# Patient Record
Sex: MALE | Race: WHITE
[De-identification: names, ages, dates, MRNs, and addresses within clinical notes are randomized per-mention and may not be internally consistent; named-entity substitution may affect disease eponyms.]

---

## 2017-05-21 ENCOUNTER — HOSPITAL ENCOUNTER (INPATIENT)
Dept: HOSPITAL 10 - FTE | Age: 52
LOS: 5 days | Discharge: HOME | DRG: 280 | End: 2017-05-26
Attending: INTERNAL MEDICINE | Admitting: INTERNAL MEDICINE
Payer: COMMERCIAL

## 2017-05-21 VITALS — RESPIRATION RATE: 17 BRPM | SYSTOLIC BLOOD PRESSURE: 122 MMHG | DIASTOLIC BLOOD PRESSURE: 82 MMHG

## 2017-05-21 VITALS
BODY MASS INDEX: 34.34 KG/M2 | HEIGHT: 70 IN | BODY MASS INDEX: 34.34 KG/M2 | WEIGHT: 239.86 LBS | WEIGHT: 239.86 LBS | HEIGHT: 70 IN

## 2017-05-21 VITALS — TEMPERATURE: 98 F

## 2017-05-21 VITALS — HEART RATE: 75 BPM

## 2017-05-21 DIAGNOSIS — R60.0: ICD-10-CM

## 2017-05-21 DIAGNOSIS — I26.99: ICD-10-CM

## 2017-05-21 DIAGNOSIS — R20.0: ICD-10-CM

## 2017-05-21 DIAGNOSIS — Z98.890: ICD-10-CM

## 2017-05-21 DIAGNOSIS — I82.432: ICD-10-CM

## 2017-05-21 DIAGNOSIS — Z88.6: ICD-10-CM

## 2017-05-21 DIAGNOSIS — S83.207A: ICD-10-CM

## 2017-05-21 DIAGNOSIS — F41.9: ICD-10-CM

## 2017-05-21 DIAGNOSIS — D64.9: ICD-10-CM

## 2017-05-21 DIAGNOSIS — R79.89: ICD-10-CM

## 2017-05-21 DIAGNOSIS — Z79.01: ICD-10-CM

## 2017-05-21 DIAGNOSIS — I21.4: ICD-10-CM

## 2017-05-21 DIAGNOSIS — G89.29: ICD-10-CM

## 2017-05-21 DIAGNOSIS — F17.200: ICD-10-CM

## 2017-05-21 DIAGNOSIS — I82.412: Primary | ICD-10-CM

## 2017-05-21 DIAGNOSIS — R06.00: ICD-10-CM

## 2017-05-21 DIAGNOSIS — M54.9: ICD-10-CM

## 2017-05-21 LAB
ADD SCAN DIFF: NO
ALBUMIN SERPL-MCNC: 4 G/DL (ref 3.3–4.9)
ALBUMIN/GLOB SERPL: 1.25 {RATIO}
ALP SERPL-CCNC: 75 IU/L (ref 42–121)
ALT SERPL-CCNC: 34 IU/L (ref 13–69)
ANION GAP SERPL CALC-SCNC: 18 MMOL/L (ref 8–16)
APTT BLD: 28.6 SEC (ref 25–35)
APTT BLD: 98.2 SEC (ref 25–35)
AST SERPL-CCNC: 21 IU/L (ref 15–46)
BASOPHILS # BLD AUTO: 0.1 10^3/UL (ref 0–0.1)
BASOPHILS NFR BLD: 0.6 % (ref 0–2)
BILIRUB DIRECT SERPL-MCNC: 0 MG/DL (ref 0–0.2)
BILIRUB SERPL-MCNC: 0.4 MG/DL (ref 0.2–1.3)
BNP SERPL-MCNC: 2200 PG/ML (ref 0–125)
BUN SERPL-MCNC: 14 MG/DL (ref 7–20)
CALCIUM SERPL-MCNC: 9.2 MG/DL (ref 8.4–10.2)
CHLORIDE SERPL-SCNC: 105 MMOL/L (ref 97–110)
CK MB SERPL-MCNC: 1.14 NG/ML (ref 0–2.4)
CK SERPL-CCNC: 62 IU/L (ref 23–200)
CO2 SERPL-SCNC: 25 MMOL/L (ref 21–31)
CREAT SERPL-MCNC: 1.14 MG/DL (ref 0.61–1.24)
EOSINOPHIL # BLD: 0.1 10^3/UL (ref 0–0.5)
EOSINOPHIL NFR BLD: 1.1 % (ref 0–7)
ERYTHROCYTE [DISTWIDTH] IN BLOOD BY AUTOMATED COUNT: 13 % (ref 11.5–14.5)
GLOBULIN SER-MCNC: 3.2 G/DL (ref 1.3–3.2)
GLUCOSE SERPL-MCNC: 117 MG/DL (ref 70–220)
HCT VFR BLD CALC: 47.3 % (ref 42–52)
HGB BLD-MCNC: 15.4 G/DL (ref 14–18)
INR PPP: 1.07
INR PPP: 1.11
LYMPHOCYTES # BLD AUTO: 2.1 10^3/UL (ref 0.8–2.9)
LYMPHOCYTES NFR BLD AUTO: 17.3 % (ref 15–51)
MCH RBC QN AUTO: 29.1 PG (ref 29–33)
MCHC RBC AUTO-ENTMCNC: 32.6 G/DL (ref 32–37)
MCV RBC AUTO: 89.4 FL (ref 82–101)
MONOCYTES # BLD: 1.2 10^3/UL (ref 0.3–0.9)
MONOCYTES NFR BLD: 10 % (ref 0–11)
NEUTROPHILS # BLD: 8.6 10^3/UL (ref 1.6–7.5)
NEUTROPHILS NFR BLD AUTO: 70.4 % (ref 39–77)
NRBC # BLD MANUAL: 0 10^3/UL (ref 0–0)
NRBC BLD QL: 0 /100WBC (ref 0–0)
PLATELET # BLD: 179 10^3/UL (ref 140–415)
PMV BLD AUTO: 10.2 FL (ref 7.4–10.4)
POTASSIUM SERPL-SCNC: 4.5 MMOL/L (ref 3.5–5.1)
PROT SERPL-MCNC: 7.2 G/DL (ref 6.1–8.1)
PROTHROMBIN TIME: 13.9 SEC (ref 12.2–14.2)
PROTHROMBIN TIME: 14.3 SEC (ref 12.2–14.2)
PT RATIO: 1.1
PT RATIO: 1.1
RBC # BLD AUTO: 5.29 10^6/UL (ref 4.7–6.1)
SODIUM SERPL-SCNC: 143 MMOL/L (ref 135–144)
TROPONIN I SERPL-MCNC: 0.12 NG/ML (ref 0–0.12)
WBC # BLD AUTO: 12.2 10^3/UL (ref 4.8–10.8)

## 2017-05-21 PROCEDURE — 93306 TTE W/DOPPLER COMPLETE: CPT

## 2017-05-21 PROCEDURE — 85302 CLOT INHIBIT PROT C ANTIGEN: CPT

## 2017-05-21 PROCEDURE — 84100 ASSAY OF PHOSPHORUS: CPT

## 2017-05-21 PROCEDURE — 96376 TX/PRO/DX INJ SAME DRUG ADON: CPT

## 2017-05-21 PROCEDURE — 84443 ASSAY THYROID STIM HORMONE: CPT

## 2017-05-21 PROCEDURE — 80061 LIPID PANEL: CPT

## 2017-05-21 PROCEDURE — 97530 THERAPEUTIC ACTIVITIES: CPT

## 2017-05-21 PROCEDURE — 85613 RUSSELL VIPER VENOM DILUTED: CPT

## 2017-05-21 PROCEDURE — 86146 BETA-2 GLYCOPROTEIN ANTIBODY: CPT

## 2017-05-21 PROCEDURE — 96374 THER/PROPH/DIAG INJ IV PUSH: CPT

## 2017-05-21 PROCEDURE — 83880 ASSAY OF NATRIURETIC PEPTIDE: CPT

## 2017-05-21 PROCEDURE — 97162 PT EVAL MOD COMPLEX 30 MIN: CPT

## 2017-05-21 PROCEDURE — 81003 URINALYSIS AUTO W/O SCOPE: CPT

## 2017-05-21 PROCEDURE — 80053 COMPREHEN METABOLIC PANEL: CPT

## 2017-05-21 PROCEDURE — 93005 ELECTROCARDIOGRAM TRACING: CPT

## 2017-05-21 PROCEDURE — 97116 GAIT TRAINING THERAPY: CPT

## 2017-05-21 PROCEDURE — 85300 ANTITHROMBIN III ACTIVITY: CPT

## 2017-05-21 PROCEDURE — 83735 ASSAY OF MAGNESIUM: CPT

## 2017-05-21 PROCEDURE — 71275 CT ANGIOGRAPHY CHEST: CPT

## 2017-05-21 PROCEDURE — 83036 HEMOGLOBIN GLYCOSYLATED A1C: CPT

## 2017-05-21 PROCEDURE — 86147 CARDIOLIPIN ANTIBODY EA IG: CPT

## 2017-05-21 PROCEDURE — 71010: CPT

## 2017-05-21 PROCEDURE — 93971 EXTREMITY STUDY: CPT

## 2017-05-21 PROCEDURE — 85305 CLOT INHIBIT PROT S TOTAL: CPT

## 2017-05-21 PROCEDURE — 96372 THER/PROPH/DIAG INJ SC/IM: CPT

## 2017-05-21 PROCEDURE — 82550 ASSAY OF CK (CPK): CPT

## 2017-05-21 PROCEDURE — 82553 CREATINE MB FRACTION: CPT

## 2017-05-21 PROCEDURE — 96375 TX/PRO/DX INJ NEW DRUG ADDON: CPT

## 2017-05-21 PROCEDURE — 81240 F2 GENE: CPT

## 2017-05-21 PROCEDURE — 80069 RENAL FUNCTION PANEL: CPT

## 2017-05-21 PROCEDURE — 85610 PROTHROMBIN TIME: CPT

## 2017-05-21 PROCEDURE — 83890: CPT

## 2017-05-21 PROCEDURE — 85730 THROMBOPLASTIN TIME PARTIAL: CPT

## 2017-05-21 PROCEDURE — 85025 COMPLETE CBC W/AUTO DIFF WBC: CPT

## 2017-05-21 PROCEDURE — 84484 ASSAY OF TROPONIN QUANT: CPT

## 2017-05-21 RX ADMIN — DIPHENHYDRAMINE HYDROCHLORIDE SCH MG: 50 INJECTION, SOLUTION INTRAMUSCULAR; INTRAVENOUS at 21:08

## 2017-05-21 RX ADMIN — ATORVASTATIN CALCIUM SCH MG: 20 TABLET, FILM COATED ORAL at 21:08

## 2017-05-21 RX ADMIN — NITROGLYCERIN PRN TAB: 0.4 TABLET SUBLINGUAL at 22:09

## 2017-05-21 RX ADMIN — MORPHINE SULFATE PRN MG: 2 INJECTION, SOLUTION INTRAMUSCULAR; INTRAVENOUS at 22:15

## 2017-05-21 RX ADMIN — BUSPIRONE HYDROCHLORIDE SCH MG: 10 TABLET ORAL at 22:41

## 2017-05-21 RX ADMIN — PREGABALIN SCH MG: 75 CAPSULE ORAL at 22:02

## 2017-05-21 RX ADMIN — NITROGLYCERIN PRN TAB: 0.4 TABLET SUBLINGUAL at 22:02

## 2017-05-21 RX ADMIN — HEPARIN SODIUM AND DEXTROSE SCH MLS/HR: 10000; 5 INJECTION INTRAVENOUS at 23:07

## 2017-05-21 NOTE — RADRPT
PROCEDURE:   CTA Chest. 

 

CLINICAL INDICATION:   SOB 

 

TECHNIQUE:   The study was performed utilizing a multidetector CT scanner. Direct spiral 1 mm axial 
sections were obtained from the thoracic inlet to the upper abdomen with the use of 100 cc of Omnipa
que 350 nonionic intravenous contrast material and reformatted at 3 mm. Coronal reformations were ob
tained. The images were reviewed on a PACS workstation. 

 

CT D I 120 mCi.

 

Dose 1045 mCi per centimeter

 

COMPARISON:   No prior studies are available for comparison. 

 

FINDINGS:

 

 

There is near occlusive thrombus extending from the distal right main pulmonary artery into the firs
t and second order branches of the right upper lobe, right middle lobe and right lower lobe.  There 
is minimal thrombus in the distal left main pulmonary artery with partially occlusive pulmonary embo
li and the segmental and subsegmental branches of the upper lobe and lower lobe.  No central saddle 
embolus is seen.  Main pulmonary artery measures 3.8 cm in diameter compatible with mild pulmonary h
ypertension.  Thoracic aorta is normal with no dissection or aneurysm.  No pleural or pericardial ef
fusion is present.  The lungs are clear of any infiltrate or mass.  No pneumothorax is seen.  There 
are visible but nonpathologically enlarged middle mediastinal nodes.

 

Liver, spleen and pancreas appear normal.  No adrenal masses present.  There are bilateral renal cys
ts.

 

Thoracic spine is normal.

 

IMPRESSION:

Extensive bilateral pulmonary emboli with near occlusive thrombus distal right main pulmonary artery
 extending into first and second order lobe are and segmental branches. Partially occlusive thrombus
 lobe are, segmental and subsegmental branches left pulmonary artery.

 

No aortic aneurysm or dissection.

 

Bilateral renal cysts.

_____________________________________________ 

.Mina Spencer MD, MD           Date    Time 

Electronically viewed and signed by .Mina Spencer MD, MD on 05/21/2017 14:25 

 

D:  05/21/2017 14:25  T:  05/21/2017 14:25

.A/

## 2017-05-21 NOTE — ERD
ER Documentation


Chief Complaint


Date/Time


DATE: 17 


TIME: 10:50


Chief Complaint


left leg swelling/pain x 1 mos


 


 (JORGE CORDOVA)





HPI


52-year-old male who presented emergency department for left lower extremity 

pain and swelling for about a month but has increased in the past few days.  

Was sent here by "Dr. Holman" for a lower extremity ultrasound to be ruled 

out for DVT.  Also stated that he has a left knee injury last , had 

an MRI of the left knee and was diagnosed with left knee meniscal tear by his 

doctor.





Denies headache, loss of consciousness, dizziness, blurry vision, changes in 

vision, photophobia, facial pain, ear pain, throat pain, difficulty swallowing, 

neck pain, shoulder pain, chest pain, cough, hemoptysis, abdominal pain, back 

pain, loss of appetite, nausea, vomiting, hematochezia, diarrhea, constipation, 

urinary symptoms, bladder and bowel incontinences, extremity weakness, numbness 

or tingling sensation, difficulty walking, recent travel, recent exposure to 

illness, recent antibiotic use in the last 3 months, fever, chills. 





Allergies to Vicodin.


Past medical history of chronic back pain.


Surgery: Back surgery in 2016.  Left knee surgery in  and .


Medication: Takes amoxicillin for his tooth abscess.


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


Smokes 5 sticks of cigarettes a day.


Denies use of alcohol, use of illegal drugs.


 (JORGE CORDOVA)





ROS


All systems reviewed and are negative except as per history of present illness.


 (JORGE CORDOVA)





Medications


Home Meds


Reported Medications


Bupropion Hcl* (Wellbutrin XL*) 300 Mg Tab.sr.24h, 300 MG PO DAILY, TAB.SA


   17


Buspirone Hcl* (Buspirone Hcl*) 10 Mg Tab, 10 MG PO BID, TAB


   17


Methocarbamol* (Methocarbamol*) 500 Mg Tablet, 500 MG ORAL BID


   17


Pregabalin* (Lyrica*) 75 Mg Capsule, 75 MG PO TID, CAP


   17


Amoxicillin* (Amoxicillin*) 500 Mg Cap, 500 MG PO Q8, #30 CAP


   17


Hydrocodone Bit-Acetaminophen* (Vicodin* ES) 7.5-300 Mg Tablet, 1 TAB PO Q4H Y 

for PAIN, TAB


   17


Hydrocodone Bit/Acetaminophen (Vicodin HP ) 1 Each Tablet, 1 TAB PO Q4H Y 

for PAIN, TAB


   17





Allergies


Allergies:  


Coded Allergies:  


     hydrocodone (Unverified  Allergy, Severe, 16)


 patient states he is allergic w/ generic Vicodin and makes


 him have Severe Migraine.





PMhx/Soc


History of Surgery:  Yes (R shoulder sx  & , L knee arthroscopy)


Anesthesia Reaction:  No


Hx Neurological Disorder:  Yes


Hx Respiratory Disorders:  Yes


Hx Cardiac Disorders:  No


Hx Psychiatric Problems:  No


Hx Miscellaneous Medical Probl:  Yes (anxiety, depression)


Hx Alcohol Use:  Yes


Hx Tobacco Use:  Yes


 (TASHA,JORGE F)





FmHx


Family History:  No coronary disease (MARILY ERICKSON A. )





Physical Exam


Vitals





Vital Signs








  Date Time  Temp Pulse Resp B/P Pulse Ox O2 Delivery O2 Flow Rate FiO2


 


17 12:34 98.3 86 16 102/86 99 Room Air  


 


17 12:33       2 


 


17 10:09 98.2 99 18 108/71 99   





 (GEORGINA,KIRASTOLOS A. DO)


Physical Exam


CONSTITUTIONAL: Well-appearing; well-nourished; in no apparent distress.  


HEAD: Normocephalic; atraumatic.  


EYES: Conjunctiva clear, sclera non-icteric, EOM intact. PERRL 


Ears: Hearing intact. EACs clear, TMs non-bulging, non-inflamed, translucent & 

mobile, ossicles normal appearance, No obstructions, no erythema, no discharges


Nose: No obstructions. No polyps. No external lesions. Mucosa non-inflamed. No 

external lesions, septum and turbinates normal. No rhinorrhea. No discharges. 

Frontal sinus is non-tender to palpation. Maxillary sinus is non-tender to 

palpation. 


MOUTH: Moist mucous membranes, no lesion, no obstructions, no vesicles, no 

thrush, patent airway


Throat: Uvula in midline. Right tonsil is +1 with no erythema, no exudate. Left 

tonsil is +1 with no erythema, no exudate. Tolerating secretions well. Good gag 

reflex. Patent airway.


Neck: Supple, without lesions, bruits, or adenopathy. No mass. Thyroid non-

enlarged and non-tender to palpation.


CHEST: Symmetrical chest. Respirations even and not labored. No retractions 

noted.


CARDIOVASCULAR: Normal S1, S2. RRR. No murmurs, gallops.


RESPIRATORY: Normal chest excursion with respiration; breath sounds clear and 

equal bilaterally; no wheezes, rhonchi, or rales.  Breathing even and 

unlabored. Speaking in clear, full, and complete sentences w/ ease. 


ABDOMEN: Normal bowel sounds normal. Soft, round, non-distended, non-guarding, 

no tenderness, no rebound, no organomegaly, no masses, no pulsating abdominal 

mass. No hernia. No peritoneal signs.


: No CVA tenderness. 


BACK: Symmetrical shoulder. Spine is midline without deformity, tenderness. No 

evidence of trauma or deformity.


PELVIS: Stable pelvis. No evidence of trauma or deformity.  


MUSCULOSKELETAL: Normal gait and station. No misalignment, asymmetry, 

crepitation, defects, tenderness, masses, effusions, decreased range of motion, 

instability, atrophy or abnormal strength or tone in the head, neck, spine, ribs

, pelvis or extremities except left calf has swelling and tenderness on 

palpation.  Left lower extremity is positive to Homans sign.  Left knee has no 

obvious deformity/swelling/discoloration.  Left hip/foot/ankle is unremarkable.

  Right hip/knee/ankle/foot is unremarkable.  Pedal pulses are good and within 

normal limits bilaterally.  Circulation and sensation is intact.  No 

neurovascular deficits.


NEUROVASCULAR: Distal pulses are present. Pedal pulse are present, equal, and 

normal. Capillary refills are < 2 seconds.


NEUROLOGIC: Alert and oriented x4. Speaks full and clear sentences. Cranial 

Nerves II-XII normal. Sensation to pain, touch, and proprioception normal. 

Grossly unremarkable. No neurologic deficits. Romberg test is negative.


PSYCHOLOGICAL: The patients mood and manner are appropriate. No hallucinations

, delusions. Not SI. Not HI. Has the capacity to decide for self


SKIN: Normal for age and ethnicity; warm; dry; good turgor; no apparent lesions 

or exudates. No rashes, hives, discoloration. Intact.  


 (JORGE CORDOVA)


Result Diagram:  


17 1110                                                                   

             17 1110





Results 24 hrs





 Laboratory Tests








Test


  17


11:10


 


White Blood Count 12.210^3/ul 


 


Red Blood Count 5.2910^6/ul 


 


Hemoglobin 15.4g/dl 


 


Hematocrit 47.3% 


 


Mean Corpuscular Volume 89.4fl 


 


Mean Corpuscular Hemoglobin 29.1pg 


 


Mean Corpuscular Hemoglobin


Concent 32.6g/dl 


 


 


Red Cell Distribution Width 13.0% 


 


Platelet Count 61012^3/UL 


 


Mean Platelet Volume 10.2fl 


 


Neutrophils % 70.4% 


 


Lymphocytes % 17.3% 


 


Monocytes % 10.0% 


 


Eosinophils % 1.1% 


 


Basophils % 0.6% 


 


Nucleated Red Blood Cells % 0.0/100WBC 


 


Neutrophils # 8.610^3/ul 


 


Lymphocytes # 2.110^3/ul 


 


Monocytes # 1.210^3/ul 


 


Eosinophils # 0.110^3/ul 


 


Basophils # 0.110^3/ul 


 


Nucleated Red Blood Cells # 0.010^3/ul 


 


Prothrombin Time 13.9Sec 


 


Prothrombin Time Ratio 1.1 


 


INR International Normalized


Ratio 1.07 


 


 


Activated Partial


Thromboplast Time 28.6Sec 


 


 


Sodium Level 143mmol/L 


 


Potassium Level 4.5mmol/L 


 


Chloride Level 105mmol/L 


 


Carbon Dioxide Level 25mmol/L 


 


Anion Gap 18 


 


Blood Urea Nitrogen 14mg/dl 


 


Creatinine 1.14mg/dl 


 


Glucose Level 117mg/dl 


 


Calcium Level 9.2mg/dl 


 


Total Bilirubin 0.4mg/dl 


 


Direct Bilirubin 0.00mg/dl 


 


Indirect Bilirubin 0.4mg/dl 


 


Aspartate Amino Transf


(AST/SGOT) 21IU/L 


 


 


Alanine Aminotransferase


(ALT/SGPT) 34IU/L 


 


 


Alkaline Phosphatase 75IU/L 


 


Troponin I 0.344ng/ml 


 


B-Type Natriuretic Peptide 2200PG/ML 


 


Total Protein 7.2g/dl 


 


Albumin 4.0g/dl 


 


Globulin 3.20g/dl 


 


Albumin/Globulin Ratio 1.25 








 Current Medications








 Medications


  (Trade)  Dose


 Ordered  Sig/Cheryl


 Route


 PRN Reason  Start Time


 Stop Time Status Last Admin


Dose Admin


 


 Enoxaparin Sodium


  (Lovenox)  110 mg  ONCE  STAT


 SC


   17 11:56


 17 12:34 DC 17 12:49


 


 


 Aspirin


  (Aspirin)  325 mg  ONCE  STAT


 PO


   17 12:49


 17 12:50 DC 17 13:27


 


 


 Hydromorphone HCl


  (Dilaudid)  1 mg  ONCE  STAT


 IV


   17 12:49


 17 12:50 DC 17 13:27


 


 


 Ondansetron HCl


  (Zofran Inj)  4 mg  ONCE  STAT


 IV


   17 12:49


 17 12:50 DC 17 13:27


 


 


 IV Flush 10 ml  10 ml  STK-MED ONCE


 .ROUTE


   17 12:57


 17 12:58 DC  


 


 


 Sodium Chloride  100 ml @ ud  STK-MED ONCE


 .ROUTE


   17 12:57


 17 12:58 DC  


 


 


 Iohexol


  (Omnipaque)  100 ml @ ud  STK-MED ONCE


 .ROUTE


   17 12:57


 17 12:58 DC  


 


 


 Iohexol


  (Omnipaque 350mg/


 ml)  50 ml  STK-MED ONCE


 .ROUTE


   17 12:57


 17 12:58 DC  


 





 (MARILY ERICKSON DO)





Procedures/MDM


Examination: Please see physical examination.





Disease process, medical treatment was explained to the patient and family 

member. They verbalized understanding and agreed with the diagnostic tests, 

medical treatment, and follow-up care.





Radiology


Venous Doppler ultrasound of the left lower extremity


Impression: Occlusive acute deep vein thrombosis of the left lower extremity 

involving the mid and distal portions of the femoral vein as well as the 

popliteal vein.





Result was discussed with supervising emergency room physician Dr. RACHAEL Erickson. 





Treatment:





Re-evaluation:





Consultation:





Differential diagnosis: DVT versus cellulitis versus lower extremity edema 

versus leg pain





Medical decision makin-year-old male who presented emergency department 

for left lower extremity pain and swelling for about a month but has increased 

in the past few days.  Was sent here by "Dr. Holman" for a lower extremity 

ultrasound to be ruled out for DVT.  Also stated that he has a left knee injury 

last , had an MRI of the left knee and was diagnosed with left knee 

meniscal tear by his doctor.  Patient's complaint, patient's history about his 

complaint, my physical findings, diagnostic test results, my reevaluation are 

consistent with my final diagnosis of deep vein thrombosis of the left lower 

extremity.  While I was explaining to the patient the result of the ultrasound, 

he stated it is having chest pain while he was lying flat in the rney case 

was discussed with supervising emergency room physician Dr. A. L[facility] 

medical decision making. He also agreed to take over/continue to care and 

continue the admission process.








 (JORGE CORDOVA)


This patient's care was transferred to me by the physician assistant.








This is a 52-year-old male who states he has had left leg pain in the calf 

region for 1 month is gotten worse over the past few days.  He was seen in by 

his primary care physician to have an evaluation for DVT.





He states this morning is also having some shortness of breath when he lays 

flat and is having some substernal chest pain off and on this morning.  This is 

also having a difficult time breathing at rest.





No syncope no palpitations headache dizziness








Const:      Well-developed, well-nourished


 Head:        Atraumatic, normocephalic


 Eyes:       Normal Conjunctiva, PERRLA, EOMI, normal sclera, no nystagmus


 ENT:         Normal External Ears, Nose and Mouth, moist mucus membranes.


 Neck:        Full range of motion.  No meningismus, no lymphadenopathy.


 Resp:         Clear to auscultation bilaterally, no wheezing, rhonchi, rales


 Cardio:       Regular rate and rhythm, no murmurs, S1 S2 present


 Abd:         Soft, non tender x 4, non distended. Normal bowel sounds, no 

guarding or rebound, no pulsitile abdominal masses or bruits


 Skin:         No petechiae or rashes, no ecchymosis , no maculopapular rash


 Back:        No midline or flank tenderness


 Ext:          No cyanosis, or edema, FROM x 4, normal inspection, 

neurovascularly intact x 4, the left calf is swollen and tender to palpation 

there is some mild tenderness behind the left knee, no erythema


 Neur:        Awake and alert, STR 5/5 x 4, sensation intact x 4, no focal 

findings, cerebellum intact


 Psych:        Normal Mood and Affect











EKG: 


Rate/Rhythm:             Normal sinus rhythm, left axis deviation


QRS, ST, QT:    NORMAL VA, QRS, QT]


Impression:      NORMAL EKG














Patient was given Lovenox 110 mg subcu.  Will obtain CT angios chest for 

pulmonary embolism evaluation





.

















PROCEDURE:   US DVT. 


 


CLINICAL INDICATION:   Pain.


 


TECHNIQUE:   Multiple longitudinal and transverse images of the left lower 

extremity veins were obtained with gray scale and color Doppler imaging.  2D 

grayscale measurements with compression, color Doppler flow, and augmentation 

was performed.   


 


COMPARISON:   None. 


 


FINDINGS:


 


The left common femoral and proximal femoral veins are patent and fully 

compressible with normal wave forms and response to augmentation.  The mid and 

distal portions of the femoral vein as well as the popliteal vein are 

noncompressible with heterogeneous intraluminal clot.  There is no detectable 

Doppler flow.


 


IMPRESSION:


 


Occlusive acute deep venous thrombosis of the left lower extremity involving 

the mid and distal portions of the femoral vein as well as the popliteal vein.


 


RPTAT: QQ


_____________________________________________ 


.Avril Bonner MD, MD           Date    Time 


Electronically viewed and signed by .Avril Bonner MD, MD on 2017 11:43 


 


D:  2017 11:43  T:  2017 11:43


.T/





CC: JORGE CORDVOA














Patient's troponin is elevated at 0.344.














PROCEDURE:   CTA Chest. 


 


CLINICAL INDICATION:   SOB 


 


TECHNIQUE:   The study was performed utilizing a multidetector CT scanner. 

Direct spiral 1 mm axial sections were obtained from the thoracic inlet to the 

upper abdomen with the use of 100 cc of Omnipaque 350 nonionic intravenous 

contrast material and reformatted at 3 mm. Coronal reformations were obtained. 

The images were reviewed on a PACS workstation. 


 


CT D I 120 mCi.


 


Dose 1045 mCi per centimeter


 


COMPARISON:   No prior studies are available for comparison. 


 


FINDINGS:


 


 


There is near occlusive thrombus extending from the distal right main pulmonary 

artery into the first and second order branches of the right upper lobe, right 

middle lobe and right lower lobe.  There is minimal thrombus in the distal left 

main pulmonary artery with partially occlusive pulmonary emboli and the 

segmental and subsegmental branches of the upper lobe and lower lobe.  No 

central saddle embolus is seen.  Main pulmonary artery measures 3.8 cm in 

diameter compatible with mild pulmonary hypertension.  Thoracic aorta is normal 

with no dissection or aneurysm.  No pleural or pericardial effusion is present.

  The lungs are clear of any infiltrate or mass.  No pneumothorax is seen.  

There are visible but nonpathologically enlarged middle mediastinal nodes.


 


Liver, spleen and pancreas appear normal.  No adrenal masses present.  There 

are bilateral renal cysts.


 


Thoracic spine is normal.


 


IMPRESSION:


Extensive bilateral pulmonary emboli with near occlusive thrombus distal right 

main pulmonary artery extending into first and second order lobe are and 

segmental branches. Partially occlusive thrombus lobe are, segmental and 

subsegmental branches left pulmonary artery.


 


No aortic aneurysm or dissection.


 


Bilateral renal cysts.


_____________________________________________ 


.Mina Spencer MD, MD           Date    Time 


Electronically viewed and signed by .Mina Spencer MD, MD on 2017 14:

25 


 


D:  2017 14:25  T:  2017 14:25


.A/





CC: MARILY ERICKSON DO














Patient is now going to be started on heparin drip, no bolus because he are to 

receive Lovenox





Is extensive bilateral pulmonary embolism





Elevated troponin from heart strain





Discussed the case with Dr. Bobo





We will admit to telemetry his oxygenation is 99% vital signs are stable





Critical Care Time:     30 minutes





Treatments/Evaluations: Close monitoring and treatment of unstable vital signs, 

cardiorespiratory, and neurologic status, while maintaining tight balance of 

fluid, respiratory, and cardiac interventions. This time includes discussing 

the case with the patient and the patient's family. This time does not include 

all procedures stated elsewhere in this record. This time also includes 

reviewing old records, labs and radiological studies. This time includes 

examining and re-examining the patient. Additionally, this time also includes 

arranging care with admitting and consulting physicians. 


 (MARILY ERICKSON DO)





Departure


Diagnosis:  


 Primary Impression:  


 Bilateral pulmonary embolism


 Additional Impression:  


 DVT (deep venous thrombosis)


 DVT location:  lower extremity  Affected thrombotic vein of extremity:  

popliteal  Laterality:  left  Chronicity:  acute  Qualified Code:  I82.432 - 

Acute deep vein thrombosis (DVT) of popliteal vein of left lower extremity


Condition:  Stable











JORGE CORDOVA TREVOR May 21, 2017 10:57


MARILY ERICKSON DO May 21, 2017 12:49

## 2017-05-21 NOTE — HP
Date/Time of Note


Date/Time of Note


DATE: 5/21/17 


TIME: 18:01





Assessment/Plan


VTE Prophylaxis


VTE Prophylaxis Intervention:  heparin (drip)





Lines/Catheters


IV Catheter Type (from Nrs):  Saline Lock





Assessment/Plan


Assessment/Plan





1.  New extensive bilateral pulmonary emboli some of which are near occlusive


2.  New multiple left lower extremity occlusive DVT


3.  Elevated troponin which could be secondary to #1 versus non-ST elevation 

myocardial infarction


4.  Chronic back pain and lower extremity numbness


5.  History of herniated disks


6.  Recent surgeries


7. Chronic anxiety/depression





Plan:





* Patient to be admitted to telemetry floor /supplemental oxygen provided /

started on heparin drip 


*  Cardiology / Hematology /pulmonary /and vascular surgical consults


* Patient's DVTs and PE seem to have a provoking cause, but will defer need for 

hypercoagulable workup to hematology


* Will continue him on his home medications for depression, provide pain control

, antiemetics as needed.


* We will complete ACS rule out /add aspirin to regimen /get 2D echo.


* Further interventions per clinical course


* Plan of care has been discussed with patient and his wife


Prophylaxis: Heparin and H2 blockers.





HPI/ROS


Admit Date/Time


Admit Date/Time


May 21, 2017





Hx of Present Illness


This is a 52-year-old male with past medical history of chronic back pain, 

anxiety and depression only who presents to emergency room today [place] 

[place] [facility] [place] has been seeing Dr. Mallory for 

more than a year, he recently had surgery to his back where he had 2 herniated 

disks back in April 2016.  However towards the end of 2016 he sustained a fall 

and a meniscal tear in his left knee that was repaired January 2017.  Since 

then though he has been having referred numbness in his left lower extremity 

from his hip all the way to his feet and just recently had a back MRI to 

evaluate that that showed that the there was damage done to the surgery done 

back in April.  Hence he started feeling lower extremity numbness and heaviness 

over the last 2-3 days, he attributed this to the injury in his back, however 

today he notices that his leg was getting red and painful and numbness was 

slightly worse.  He then asked his wife to massage his leg and then she noticed 

that it was significantly more swollen than the right leg.  Said he wanted to 

see the orthopedic surgeon who ordered a lower extremity ultrasound and this 

came back positive for DVT and was told to come to the emergency room.  In the 

emergency room he also complained of some chest discomfort and shortness of 

breath and a CT angiogram showed that he had extensive pulmonary emboli as 

well.  He is being admitted for further management and care.





ROS


12 point review if systems was done and pertinent findings are as noted.





PMH/Family/Social


Past Medical History


* Anxiety


* depression 


* chronic back pain





Past Surgical History


* Back surgery April 2016


* Left knee meniscal repair January 2017


* Shoulder surgery





Family History


Significant Family History:  other (No family history of blood clots or 

hypercoagulable disorder)





Social History


Alcohol Use:  occasionally


Smoking Status:  Current every day smoker


Drug Use:  none





Exam/Review of Systems


Vital Signs


Vitals





 Vital Signs








  Date Time  Temp Pulse Resp B/P Pulse Ox O2 Delivery O2 Flow Rate FiO2


 


5/21/17 17:23 98.3 86 16 133/82 99 Room Air  


 


5/21/17 12:33       2 











Exam


Exam


GENERAL:  Patient is alert, oriented x 3, obese , oxygen mask in place for 

comfort. 


.Patient makes good eye contact, is conversant, interactive, coherent. Patient 

appears calm and comfortable and is able to follow commands. 





HEENT:  Oropharynx is clear.  There is no carotid bruit, no masses. Patient's 

pupils are equal, round and reactive to light bilaterally. Extraocular motions 

are intact. There is no scleral icterus. There is no facial asymmetry.





NECK:  Supple.





LUNGS:  Clear to auscultation bilaterally with diminished air entry. No Wheezes 

or crackles.





HEART:  S1, S2.  No murmur, gallops or rubs.  Regular rate and rhythm.





ABDOMEN:  Soft, nontender. Normoactive bowel sounds.  There are no stigmata of 

chronic liver disease. 





BACK:  no costovertebral angle tenderness.   





GENITOURINARY:  Deferred.





EXTREMITIES: Left leg is larger than right leg and diffusely erythematous . 

there are 2+ pulses bilaterally distally.





NEUROLOGIC:  The patient has no lateralizing signs.  Cranial nerves II-XII are 

intact.





SKIN:  Otherwise, unremarkable.





Labs


Result Diagram:  


5/21/17 1110                                                                   

             5/21/17 1110








Medications


Medications





 Current Medications


Sodium Chloride (NS) 1,000 ml @  80 mls/hr [id number] IV  Last administered on 5/21 /17at 15:10; Admin Dose 80 MLS/HR;  Start 5/21/17 at 14:44;  Stop 5/22/17 at 03:

13


Buspirone HCl (Buspar) 10 mg BID PO ;  Start 5/21/17 at 21:00


Pregabalin (Lyrica) 75 mg BID PO ;  Start 5/21/17 at 21:00


Miscellaneous Information 1 tab Q4H  PRN PO PAIN;  Start 5/21/17 at 18:00;  

Status UNV


Methocarbamol (Robaxin) 500 mg BID PO ;  Start 5/21/17 at 18:00


Morphine Sulfate (morphine) 2 mg Q4H  PRN IV pain;  Start 5/21/17 at 18:00


Polyethylene Glycol (Miralax) 8.5 gm DAILY PO ;  Start 5/22/17 at 09:00


Famotidine (Pepcid Iv) 20 mg BID IV ;  Start 5/21/17 at 21:00


Lorazepam (Ativan) 1 mg ONCE  ONCE IV ;  Start 5/21/17 at 18:00;  Stop 5/21/17 

at 18:01





Procedures


Procedures





 Laboratory Tests








Test


  5/21/17


11:10


 


White Blood Count 12.210^3/ul 


 


Red Blood Count 5.2910^6/ul 


 


Hemoglobin 15.4g/dl 


 


Hematocrit 47.3% 


 


Mean Corpuscular Volume 89.4fl 


 


Mean Corpuscular Hemoglobin 29.1pg 


 


Mean Corpuscular Hemoglobin


Concent 32.6g/dl 


 


 


Red Cell Distribution Width 13.0% 


 


Platelet Count 27946^3/UL 


 


Mean Platelet Volume 10.2fl 


 


Neutrophils % 70.4% 


 


Lymphocytes % 17.3% 


 


Monocytes % 10.0% 


 


Eosinophils % 1.1% 


 


Basophils % 0.6% 


 


Nucleated Red Blood Cells % 0.0/100WBC 


 


Neutrophils # 8.610^3/ul 


 


Lymphocytes # 2.110^3/ul 


 


Monocytes # 1.210^3/ul 


 


Eosinophils # 0.110^3/ul 


 


Basophils # 0.110^3/ul 


 


Nucleated Red Blood Cells # 0.010^3/ul 


 


Prothrombin Time 13.9Sec 


 


Prothrombin Time Ratio 1.1 


 


INR International Normalized


Ratio 1.07 


 


 


Activated Partial


Thromboplast Time 28.6Sec 


 


 


Sodium Level 143mmol/L 


 


Potassium Level 4.5mmol/L 


 


Chloride Level 105mmol/L 


 


Carbon Dioxide Level 25mmol/L 


 


Anion Gap 18 


 


Blood Urea Nitrogen 14mg/dl 


 


Creatinine 1.14mg/dl 


 


Glucose Level 117mg/dl 


 


Calcium Level 9.2mg/dl 


 


Total Bilirubin 0.4mg/dl 


 


Direct Bilirubin 0.00mg/dl 


 


Indirect Bilirubin 0.4mg/dl 


 


Aspartate Amino Transf


(AST/SGOT) 21IU/L 


 


 


Alanine Aminotransferase


(ALT/SGPT) 34IU/L 


 


 


Alkaline Phosphatase 75IU/L 


 


Troponin I 0.344ng/ml 


 


B-Type Natriuretic Peptide 2200PG/ML 


 


Total Protein 7.2g/dl 


 


Albumin 4.0g/dl 


 


Globulin 3.20g/dl 


 


Albumin/Globulin Ratio 1.25 








________________________________________________________________________________

____________________________________________


PROCEDURE:   CTA Chest. 


 


CLINICAL INDICATION:   SOB 


 


TECHNIQUE:   The study was performed utilizing a multidetector CT scanner. 

Direct spiral 1 mm axial sections were obtained from the thoracic inlet to the 

upper abdomen with the use of 100 cc of Omnipaque 350 nonionic intravenous 

contrast material and reformatted at 3 mm. Coronal reformations were obtained. 

The images were reviewed on a PACS workstation. 


 


CT D I 120 mCi.


 


Dose 1045 mCi per centimeter


 


COMPARISON:   No prior studies are available for comparison. 


 


FINDINGS:


 


 


There is near occlusive thrombus extending from the distal right main pulmonary 

artery into the first and second order branches of the right upper lobe, right 

middle lobe and right lower lobe.  There is minimal thrombus in the distal left 

main pulmonary artery with partially occlusive pulmonary emboli and the 

segmental and subsegmental branches of the upper lobe and lower lobe.  No 

central saddle embolus is seen.  Main pulmonary artery measures 3.8 cm in 

diameter compatible with mild pulmonary hypertension.  Thoracic aorta is normal 

with no dissection or aneurysm.  No pleural or pericardial effusion is present.

  The lungs are clear of any infiltrate or mass.  No pneumothorax is seen.  

There are visible but nonpathologically enlarged middle mediastinal nodes.


 


Liver, spleen and pancreas appear normal.  No adrenal masses present.  There 

are bilateral renal cysts.


 


Thoracic spine is normal.


 


IMPRESSION:


Extensive bilateral pulmonary emboli with near occlusive thrombus distal right 

main pulmonary artery extending into first and second order lobe are and 

segmental branches. Partially occlusive thrombus lobe are, segmental and 

subsegmental branches left pulmonary artery.


 


No aortic aneurysm or dissection.


 


Bilateral renal cysts.


_____________________________________________ 


.Mina Spencer MD, MD           Date    Time 


Electronically viewed and signed by .Mina Spencer MD, MD on 05/21/2017 14:

25 


 


D:  05/21/2017 14:25  T:  05/21/2017 14:25


.A/





CC: MARILY ERICKSON DO


________________________________________________________________________________

____________________________________________


PROCEDURE:   US DVT. 


 


CLINICAL INDICATION:   Pain.


 


TECHNIQUE:   Multiple longitudinal and transverse images of the left lower 

extremity veins were obtained with gray scale and color Doppler imaging.  2D 

grayscale measurements with compression, color Doppler flow, and augmentation 

was performed.   


 


COMPARISON:   None. 


 


FINDINGS:


 


The left common femoral and proximal femoral veins are patent and fully 

compressible with normal wave forms and response to augmentation.  The mid and 

distal portions of the femoral vein as well as the popliteal vein are 

noncompressible with heterogeneous intraluminal clot.  There is no detectable 

Doppler flow.


 


IMPRESSION:


 


Occlusive acute deep venous thrombosis of the left lower extremity involving 

the mid and distal portions of the femoral vein as well as the popliteal vein.


 


RPTAT: QQ


_____________________________________________ 


.Avril Bonner MD, MD           Date    Time 


Electronically viewed and signed by .Avril Bonner MD, MD on 05/21/2017 11:43 


 


D:  05/21/2017 11:43  T:  05/21/2017 11:43


.T/





CC: JORGE CORDOVA


________________________________________________________________________________

____________________________________________


PROCEDURE:   XR, Chest. 


 


CLINICAL INDICATION:   Chest pain. 


 


TECHNIQUE:   AP chest 


 


COMPARISON:   Chest, 04/27/2016.


 


FINDINGS:


 


There is no acute infiltrate in the lungs.  No pleural effusion.  The heart is 

not enlarged. 


 


IMPRESSION:


 


1.  Unremarkable chest x-ray.


 


RPTAT: GG


_____________________________________________ 


.Mina Hamilton MD, MD           Date    Time 


Electronically viewed and signed by .Mina Hamilton MD, MD on 05/21/2017 14:48 


 


D:  05/21/2017 14:48  T:  05/21/2017 14:48


.Y/





CC: JORGE CORDOVA


________________________________________________________________________________

____________________________________________


I reviewed EKG


Rate: 91bpm


Rhythm: sinus


Note:  No ST elevation or depressions noted concerning for acute ischemic event.











JO LEE. May 21, 2017 18:16

## 2017-05-21 NOTE — RADRPT
PROCEDURE:   XR, Chest. 

 

CLINICAL INDICATION:   Chest pain. 

 

TECHNIQUE:   AP chest 

 

COMPARISON:   Chest, 04/27/2016.

 

FINDINGS:

 

There is no acute infiltrate in the lungs.  No pleural effusion.  The heart is not enlarged. 

 

IMPRESSION:

 

1.  Unremarkable chest x-ray.

 

RPTAT: GG

_____________________________________________ 

.Mina Hamilton MD, MD           Date    Time 

Electronically viewed and signed by .Mina Hamilton MD, MD on 05/21/2017 14:48 

 

D:  05/21/2017 14:48  T:  05/21/2017 14:48

.Y/

## 2017-05-21 NOTE — RADRPT
PROCEDURE:   US DVT. 

 

CLINICAL INDICATION:   Pain.

 

TECHNIQUE:   Multiple longitudinal and transverse images of the left lower extremity veins were obta
ined with gray scale and color Doppler imaging.  2D grayscale measurements with compression, color D
oppler flow, and augmentation was performed.   

 

COMPARISON:   None. 

 

FINDINGS:

 

The left common femoral and proximal femoral veins are patent and fully compressible with normal wav
e forms and response to augmentation.  The mid and distal portions of the femoral vein as well as th
e popliteal vein are noncompressible with heterogeneous intraluminal clot.  There is no detectable D
oppler flow.

 

IMPRESSION:

 

Occlusive acute deep venous thrombosis of the left lower extremity involving the mid and distal port
ions of the femoral vein as well as the popliteal vein.

 

RPTAT: QQ

_____________________________________________ 

.Avril Bonner MD, MD           Date    Time 

Electronically viewed and signed by .Avril Bonner MD, MD on 05/21/2017 11:43 

 

D:  05/21/2017 11:43  T:  05/21/2017 11:43

.T/

## 2017-05-22 VITALS — SYSTOLIC BLOOD PRESSURE: 119 MMHG | RESPIRATION RATE: 20 BRPM | DIASTOLIC BLOOD PRESSURE: 73 MMHG

## 2017-05-22 VITALS — DIASTOLIC BLOOD PRESSURE: 73 MMHG | RESPIRATION RATE: 20 BRPM | SYSTOLIC BLOOD PRESSURE: 125 MMHG

## 2017-05-22 VITALS — HEART RATE: 78 BPM

## 2017-05-22 VITALS — DIASTOLIC BLOOD PRESSURE: 65 MMHG | RESPIRATION RATE: 18 BRPM | SYSTOLIC BLOOD PRESSURE: 115 MMHG

## 2017-05-22 VITALS — RESPIRATION RATE: 18 BRPM | DIASTOLIC BLOOD PRESSURE: 79 MMHG | SYSTOLIC BLOOD PRESSURE: 130 MMHG

## 2017-05-22 VITALS — HEART RATE: 72 BPM

## 2017-05-22 VITALS — SYSTOLIC BLOOD PRESSURE: 115 MMHG | RESPIRATION RATE: 17 BRPM | DIASTOLIC BLOOD PRESSURE: 81 MMHG

## 2017-05-22 VITALS — HEART RATE: 80 BPM

## 2017-05-22 VITALS — HEART RATE: 66 BPM

## 2017-05-22 VITALS — DIASTOLIC BLOOD PRESSURE: 64 MMHG | RESPIRATION RATE: 20 BRPM | SYSTOLIC BLOOD PRESSURE: 114 MMHG

## 2017-05-22 VITALS — HEART RATE: 63 BPM

## 2017-05-22 LAB
ADD SCAN DIFF: NO
ALBUMIN SERPL-MCNC: 3 G/DL (ref 3.3–4.9)
ANION GAP SERPL CALC-SCNC: 14 MMOL/L (ref 8–16)
BASOPHILS # BLD AUTO: 0.1 10^3/UL (ref 0–0.1)
BASOPHILS NFR BLD: 0.7 % (ref 0–2)
BUN SERPL-MCNC: 17 MG/DL (ref 7–20)
CALCIUM SERPL-MCNC: 8.4 MG/DL (ref 8.4–10.2)
CHLORIDE SERPL-SCNC: 105 MMOL/L (ref 97–110)
CK MB SERPL-MCNC: 0.99 NG/ML (ref 0–2.4)
CK SERPL-CCNC: 55 IU/L (ref 23–200)
CO2 SERPL-SCNC: 26 MMOL/L (ref 21–31)
CREAT SERPL-MCNC: 1.15 MG/DL (ref 0.61–1.24)
EOSINOPHIL # BLD: 0.3 10^3/UL (ref 0–0.5)
EOSINOPHIL NFR BLD: 3.1 % (ref 0–7)
ERYTHROCYTE [DISTWIDTH] IN BLOOD BY AUTOMATED COUNT: 13.2 % (ref 11.5–14.5)
GLUCOSE SERPL-MCNC: 99 MG/DL (ref 70–220)
HCT VFR BLD CALC: 40.9 % (ref 42–52)
HGB BLD-MCNC: 12.9 G/DL (ref 14–18)
LYMPHOCYTES # BLD AUTO: 1.7 10^3/UL (ref 0.8–2.9)
LYMPHOCYTES NFR BLD AUTO: 21 % (ref 15–51)
MAGNESIUM SERPL-MCNC: 2 MG/DL (ref 1.7–2.5)
MCH RBC QN AUTO: 28.7 PG (ref 29–33)
MCHC RBC AUTO-ENTMCNC: 31.5 G/DL (ref 32–37)
MCV RBC AUTO: 91.1 FL (ref 82–101)
MONOCYTES # BLD: 0.9 10^3/UL (ref 0.3–0.9)
MONOCYTES NFR BLD: 10.7 % (ref 0–11)
NEUTROPHILS # BLD: 5.2 10^3/UL (ref 1.6–7.5)
NEUTROPHILS NFR BLD AUTO: 63.8 % (ref 39–77)
NRBC # BLD MANUAL: 0 10^3/UL (ref 0–0)
NRBC BLD QL: 0 /100WBC (ref 0–0)
PHOSPHATE SERPL-MCNC: 4.8 MG/DL (ref 2.5–4.9)
PLATELET # BLD: 146 10^3/UL (ref 140–415)
PMV BLD AUTO: 10.5 FL (ref 7.4–10.4)
POTASSIUM SERPL-SCNC: 4.5 MMOL/L (ref 3.5–5.1)
RBC # BLD AUTO: 4.49 10^6/UL (ref 4.7–6.1)
SODIUM SERPL-SCNC: 140 MMOL/L (ref 135–144)
TROPONIN I SERPL-MCNC: 0.09 NG/ML (ref 0–0.12)
TSH SERPL-ACNC: 1.82 MIU/L (ref 0.47–4.68)
WBC # BLD AUTO: 8.1 10^3/UL (ref 4.8–10.8)

## 2017-05-22 RX ADMIN — NITROGLYCERIN PRN TAB: 0.4 TABLET SUBLINGUAL at 19:20

## 2017-05-22 RX ADMIN — NITROGLYCERIN PRN TAB: 0.4 TABLET SUBLINGUAL at 19:03

## 2017-05-22 RX ADMIN — PREGABALIN SCH MG: 75 CAPSULE ORAL at 20:51

## 2017-05-22 RX ADMIN — MORPHINE SULFATE PRN MG: 2 INJECTION, SOLUTION INTRAMUSCULAR; INTRAVENOUS at 20:51

## 2017-05-22 RX ADMIN — PREGABALIN SCH MG: 75 CAPSULE ORAL at 08:42

## 2017-05-22 RX ADMIN — METHOCARBAMOL PRN MG: 500 TABLET ORAL at 20:52

## 2017-05-22 RX ADMIN — ATORVASTATIN CALCIUM SCH MG: 20 TABLET, FILM COATED ORAL at 20:51

## 2017-05-22 RX ADMIN — NITROGLYCERIN PRN TAB: 0.4 TABLET SUBLINGUAL at 14:30

## 2017-05-22 RX ADMIN — HEPARIN SODIUM AND DEXTROSE SCH MLS/HR: 10000; 5 INJECTION INTRAVENOUS at 06:28

## 2017-05-22 RX ADMIN — HEPARIN SODIUM AND DEXTROSE SCH MLS/HR: 10000; 5 INJECTION INTRAVENOUS at 05:15

## 2017-05-22 RX ADMIN — MORPHINE SULFATE PRN MG: 2 INJECTION, SOLUTION INTRAMUSCULAR; INTRAVENOUS at 08:48

## 2017-05-22 RX ADMIN — HEPARIN SODIUM AND DEXTROSE SCH MLS/HR: 10000; 5 INJECTION INTRAVENOUS at 20:38

## 2017-05-22 RX ADMIN — NITROGLYCERIN PRN TAB: 0.4 TABLET SUBLINGUAL at 07:31

## 2017-05-22 RX ADMIN — BUSPIRONE HYDROCHLORIDE SCH MG: 10 TABLET ORAL at 08:46

## 2017-05-22 RX ADMIN — DIPHENHYDRAMINE HYDROCHLORIDE SCH MG: 50 INJECTION, SOLUTION INTRAMUSCULAR; INTRAVENOUS at 21:00

## 2017-05-22 RX ADMIN — BUSPIRONE HYDROCHLORIDE SCH MG: 10 TABLET ORAL at 21:00

## 2017-05-22 RX ADMIN — ASPIRIN SCH MG: 81 TABLET, COATED ORAL at 08:42

## 2017-05-22 RX ADMIN — MORPHINE SULFATE PRN MG: 2 INJECTION, SOLUTION INTRAMUSCULAR; INTRAVENOUS at 04:04

## 2017-05-22 RX ADMIN — MORPHINE SULFATE PRN MG: 2 INJECTION, SOLUTION INTRAMUSCULAR; INTRAVENOUS at 12:30

## 2017-05-22 RX ADMIN — METHOCARBAMOL PRN MG: 500 TABLET ORAL at 08:47

## 2017-05-22 RX ADMIN — POLYETHYLENE GLYCOL 3350 SCH GM: 17 POWDER, FOR SOLUTION ORAL at 08:43

## 2017-05-22 RX ADMIN — DIPHENHYDRAMINE HYDROCHLORIDE SCH MG: 50 INJECTION, SOLUTION INTRAMUSCULAR; INTRAVENOUS at 08:41

## 2017-05-22 RX ADMIN — MORPHINE SULFATE PRN MG: 2 INJECTION, SOLUTION INTRAMUSCULAR; INTRAVENOUS at 16:48

## 2017-05-22 RX ADMIN — NITROGLYCERIN PRN TAB: 0.4 TABLET SUBLINGUAL at 23:10

## 2017-05-22 RX ADMIN — METOPROLOL TARTRATE SCH MG: 25 TABLET, FILM COATED ORAL at 21:00

## 2017-05-22 NOTE — CONS
Date/Time of Note


Date/Time of Note


DATE: 5/22/17 


TIME: 11:39





Assessment/Plan


Assessment/Plan


Additional Assessment/Plan


Assessment recommendations;





1.  Patient admitted with DVT involving lower extremity with bilateral 

pulmonary emboli.


2.  No precipitating factor like any recent immobilization or long travel.


3.  Patient is a current smoker.


4.  History of hyperlipidemia.


5.  Chronic pain.


6.  History of back surgeries.





Continue current treatment.  It is my understanding that the patient has been 

scheduled for a Marely filter placement.  Patient likely will need lifelong 

anticoagulation.  Obtain a 2D echocardiogram for evaluation of any evidence of 

right ventricular strain.





Consultation Date/Type/Reason


Admit Date/Time


May 21, 2017


Date of Consultation:  May 22, 2017


Type of Consultation:  Pulmonary


Reason for Consultation


Pulmonary consultations requested for evaluation of bilateral pulmonary 

embolism.





History presenting any; patient is a 52-year-old white male who came into the 

emergency room yesterday sent over to the hospital by his orthopedic doctor 

because of persistent swelling in his leg.  Upon evaluation ultrasound was done 

which is showing a DVT patient was sent over to the ER for further evaluation 

patient also been complaining of shortness of breath for the last 3 days and 

CTA of the chest was done which is showing bilateral pulmonary emboli.  The 

patient does complain of shortness of breath as well as chest pain with certain 

movements and coughing.  Denies any hemoptysis.  Any fever chills.  According 

to him he is having neck pain for the last 1 month.





Past medical history; next





1.  Patient with a history of back surgery in April 2016.


2.  History of herniated disc.


3.  History of chronic pain.


4.  Hyperlipidemia.





Medications; reviewed.





Allergies; hydrocodone.





Social history; patient is a current smoker.





Family history; patient is , no shift any clotting disorders in the 

family.





Occupational history; patient does supervisor kind of work.





Review of systems; denies any headache, fever, cough, hemoptysis.  Complains of 

chest pain as described above.  Denies any abdominal pain, nausea vomiting.  

Any bleeding tendencies.  Has gained some weight.  Complains of orthopnea.  

Complains of leg pain.  Complains of chronic pain.





General exam; young male, awake and alert currently in no distress.


Constitutional:  no complaints


Eyes:  no complaints


ENT:  no complaints


Respiratory:  pleuritic pain, shortness of breath


Cardiovascular:  chest pain


Genitourinary:  no complaints


Musculoskeletal:  back pain, bone/joint pain


Skin:  no complaints


Neurologic:  other (LLE numbness)


Endocrine:  no complaints


Psychological:  anxiety, depression





Social History


Alcohol Use:  occasionally


Smoking Status:  Current every day smoker


Drug Use:  none





Exam/Review of Systems


Vital Signs


Vitals





 Vital Signs








  Date Time  Temp Pulse Resp B/P Pulse Ox O2 Delivery O2 Flow Rate FiO2


 


5/22/17 08:11  66      


 


5/22/17 08:00      Simple Mask 8.0 


 


5/22/17 07:19 98.3  20 119/73 99   














 Intake and Output   


 


 5/21/17 5/21/17 5/22/17





 15:00 23:00 07:00


 


Intake Total   900 ml


 


Balance   900 ml











Exam


HEENT exam is; supple neck, no JVD.  No lymphadenopathy.  Midline trachea.  No 

thyromegaly.  Pharynx is clear.  Patient has fair dentition.  





Chest examination; clear to auscultation.  S1-S2 audible, no murmurs.  Regular 

rhythm.





Abdomen exam is; protuberant.  Nontender.  Bowel sounds audible.





Extremity exam; there is tenderness involving the left calf area.  No 

peripheral edema.





CNS examination; no focal deficit.





Results


Result Diagram:  


5/22/17 0430                                                                   

             5/22/17 0430





Results 24 hrs





Laboratory Tests








Test


  5/21/17


19:15 5/21/17


21:05 5/22/17


01:06 5/22/17


04:30


 


Creatine Kinase 62    55   


 


Creatine Kinase Index 1.8    1.8   


 


Creatinine Kinase MB (Mass) 1.14    0.99   


 


Troponin I 0.122  *H  0.092   


 


Prothrombin Time  14.3  H  


 


Prothrombin Time Ratio  1.1    


 


INR International Normalized


Ratio 


  1.11  


  


  


 


 


Activated Partial


Thromboplast Time 


  98.2  *H


  


  106.0  *H


 


 


White Blood Count    8.1  #


 


Red Blood Count    4.49  L


 


Hemoglobin    12.9  L


 


Hematocrit    40.9  L


 


Mean Corpuscular Volume    91.1  


 


Mean Corpuscular Hemoglobin    28.7  L


 


Mean Corpuscular Hemoglobin


Concent 


  


  


  31.5  L


 


 


Red Cell Distribution Width    13.2  


 


Platelet Count    146  


 


Mean Platelet Volume    10.5  H


 


Neutrophils %    63.8  


 


Lymphocytes %    21.0  


 


Monocytes %    10.7  


 


Eosinophils %    3.1  


 


Basophils %    0.7  


 


Nucleated Red Blood Cells %    0.0  


 


Neutrophils #    5.2  


 


Lymphocytes #    1.7  


 


Monocytes #    0.9  


 


Eosinophils #    0.3  


 


Basophils #    0.1  


 


Nucleated Red Blood Cells #    0.0  


 


Sodium Level    140  


 


Potassium Level    4.5  


 


Chloride Level    105  


 


Carbon Dioxide Level    26  


 


Anion Gap    14  


 


Blood Urea Nitrogen    17  


 


Creatinine    1.15  


 


Glucose Level    99  


 


Hemoglobin A1c    5.7  


 


Calcium Level    8.4  


 


Phosphorus Level    4.8  


 


Magnesium Level    2.0  


 


Albumin    3.0  #L


 


Thyroid Stimulating Hormone


(TSH) 


  


  


  1.820  


 











Medications


Medications





 Current Medications


Buspirone HCl (Buspar) 10 mg BID PO  Last administered on 5/22/17at 08:46; 

Admin Dose 10 MG;  Start 5/21/17 at 21:00


Pregabalin (Lyrica) 75 mg BID PO  Last administered on 5/22/17at 08:42; Admin 

Dose 75 MG;  Start 5/21/17 at 21:00


Morphine Sulfate (morphine) 2 mg Q4H  PRN IV pain Last administered on 5/22/ 17at 08:48; Admin Dose 2 MG;  Start 5/21/17 at 18:00


Polyethylene Glycol (Miralax) 8.5 gm DAILY PO ;  Start 5/22/17 at 09:00


Famotidine (Pepcid Iv) 20 mg BID IV  Last administered on 5/22/17at 08:41; 

Admin Dose 20 MG;  Start 5/21/17 at 21:00


Aspirin (Halfprin) 81 mg DAILY PO  Last administered on 5/22/17at 08:42; Admin 

Dose 81 MG;  Start 5/22/17 at 09:00


Atorvastatin Calcium (Lipitor) 20 mg HS PO  Last administered on 5/21/17at 21:08

; Admin Dose 20 MG;  Start 5/21/17 at 21:00


Methocarbamol (Robaxin) 500 mg BID  PRN PO msc spasm Last administered on 5/22/ 17at 08:47; Admin Dose 500 MG;  Start 5/21/17 at 18:30


Ondansetron HCl (Zofran Inj) 4 mg Q6H  PRN IV NAUSEA AND/OR VOMITING;  Start 5/ 21/17 at 18:30


Amoxicillin (Amoxicillin) 500 mg Q8 PO  Last administered on 5/22/17at 05:59; 

Admin Dose 500 MG;  Start 5/21/17 at 22:00


Nitroglycerin (Nitroglycerin (Sl Tab) 0.4 Mg) 1 tab Q5M  PRN SL ANGINA Last 

administered on 5/22/17at 07:31; Admin Dose 1 TAB;  Start 5/21/17 at 22:00











SHIRIN LUCIO May 22, 2017 11:44

## 2017-05-22 NOTE — CONS
DATE OF ADMISSION: 05/21/2017

DATE OF CONSULTATION:  05/22/2017

 

 

 

REASON FOR CONSULTATION:  Positive troponin.

 

REQUESTING PHYSICIAN:  Dr. Lee from the hospitalist service.

 

HISTORY OF PRESENT ILLNESS:  Mr. Krishnan is a 52-year-old male with a history of a herniated disk, chr
onic back pain, chronic anxiety, depression who initially presented with complaints of shortness of 
breath, lower extremity leg swelling.  Upon arrival, temperature of 98.2, blood pressure 108/71, pul
se 99, respiratory rate 18, saturating 99%.  The patient's labs revealed a white count 12.2, hemoglo
bin 15.4, platelet count 179.  A sodium 143, potassium 4.5, creatinine 1.1, BUN 14, AST 21, AST 34. 
 BNP of 2200.  INR of 1.0.  The patient underwent a venous ultrasound revealing occlusive acute deep
 vein thrombosis, left lower extremity, involving the mid distal portion of the femoral vein as well
 as popliteal vein.  The patient underwent a chest x-ray revealing unremarkable chest x-ray and then
 a CT/CTA that revealed extensive bilateral pulmonary emboli with near occlusive thrombus distal rig
ht main pulmonary artery extending into the ____ lower lobe and segmental branches, partially occlud
es thrombus or segmental branches in the left pulmonary artery.  No aortic aneurysm or dissection.  
The patient's electrocardiogram revealed normal sinus rhythm, rate of 91, left axis deviation, later
al T-wave inversion, and borderline anteroseptal Q's.  The patient admitted to the floor.  Initiated
 on heparin and aspirin.  The patient had troponins trended, initially trending mild positive and th
en trending negative.

 

PAST MEDICAL HISTORY:  As above in HPI.

 

MEDICATIONS CURRENTLY IN HOSPITAL:  

1.  MiraLax.

2.  Aspirin 81 mg daily.

3.  ____ mg q.8h.

4.  Nitroglycerin.  

5.  BuSpar.

6.  Lyrica.

7.  Pepcid.

8.  Lipitor 20 mg at bedtime.

9.  Heparin IV.

 

ALLERGIES:  HYDROCODONE.

 

SOCIAL HISTORY:  Positive tobacco.  Social ETOH.  No illicit drug use.

 

FAMILY HISTORY:  No history of sudden cardiac death or early CAD.

 

REVIEW OF SYSTEMS:  As above in HPI.

CONSTITUTIONAL:  No fevers, chills.

PULMONARY:  Shortness of breath, dyspnea on exertion.

CARDIOVASCULAR:  Exertional chest pain.

GASTROINTESTINAL:  No vomiting.

GENITOURINARY:  No hematuria.

MUSCULOSKELETAL:  Degenerative joint disease.

PSYCHIATRIC:  The patient has a history of anxiety and depression.

NEUROLOGIC:  No documented CVA.  

HEMATOLOGIC:  DVT.

 

PHYSICAL EXAMINATION:

VITAL SIGNS:  Temperature 97.8, blood pressure 115/65, pulse 74, respiratory rate 18, satting 99%.

GENERAL:  The patient is alert, awake, complaining of exertional chest pain, exertional shortness of
 breath.

NECK:  JVP approximately 9 cm water.

CHEST:  Fair air movement throughout.

HEART:  Regular rate and rhythm.  Normal S1, increased S2, I/VI systolic murmur, nondisplaced PMI.

ABDOMEN:  Positive bowel sounds, soft.

EXTREMITIES:  Left lower extremity pitting edema.  1+ pulses bilaterally, posterior tibial.

 

LABORATORY DATA:  Most recently from today:  Sodium 140, potassium 4.5, creatinine 1.1, BUN 17.  TSH
 1.82.  White blood cell count 8.1, hemoglobin 12.9, platelet count 146.

 

IMAGING STUDIES:  As above in HPI.  No further imaging studies for my review at this time.

 

ELECTROCARDIOGRAM:  As above in HPI.  No further electrocardiograms for my review at this time.

 

IMPRESSION:

1.  Positive troponin in the setting of bilateral pulmonary emboli.

2.  Chest pain in the setting of bilateral pulmonary emboli.

3.  Dyspnea on exertion with known bilateral pulmonary emboli.

4.  Lower extremity edema with known lower extremity deep venous thrombosis.

5.  Anemia, mild.

6.  Ongoing tobacco usage.

 

RECOMMENDATIONS:

1.  At this time, would maintain patient on IV heparin with transition to Coumadin.  Consideration f
or IVC filter.

2.  We will follow the patient's 2D echo done for assessment of ejection fraction, wall motion, and 
any major valve abnormalities.

3.  We will initiate patient on low dose beta blocker in the setting of positive troponins.  These a
re likely due to bilateral pulmonary emboli, but could additionally be due to concurrent coronary di
sease, given significant family history of early CAD and bypass surgery.

4.  Check a fasting lipid panel and adjust the patient's lipid lowering medication as necessary.

5.  Consider gentle Lasix diuresis.

 

Thank you for allowing me to take part in the care of this patient.  I will continue to follow along
 very closely with you.  Further recommendations will be made as the patient progresses through his 
inpatient hospital clinical course.

 

 

Dictated By: CARL DEAL/SARTHAK

DD:    05/22/2017 20:33:40

DT:    05/22/2017 23:16:39

Conf#: [id number]

DID#:  [id number]

CC: JO LEE MD;*[facility]*

## 2017-05-22 NOTE — CONS
DATE OF ADMISSION: 05/21/2017

DATE OF CONSULTATION:  05/22/2017

 

 

 

TYPE OF CONSULTATION: Vascular surgery consultation.

 

Dear Doctors:

 

Mr. Krishnan is a 52-year-old gentleman with a history of chronic back pain, anxiety and depression who
 has had multiple orthopedic related injuries.  From what we can gather, the patient has had a histo
ry of back surgery for 2 herniated disks back in April 2016.  Patient mentions that this was related
 to the fact that he had a back injury back in 2003.  Further, the patient had a left knee injury in
 which he had a meniscal tear and had it repaired in January 2017.  In addition to that, the patient
 has had difficulty with his ambulation, where he has numbness and heaviness over the past 2 to 3 da
ys and worsening swelling since 04/22/2017.  It seems the patient had been able to withstand the kal
n; however, he developed shortness of breath over the past 2 to 3 days and was admitted to [facility] for further workup.  Upon his CT angiography, it was identified the patient has 
extensive pulmonary emboli with occlusive acute deep vein thrombosis in the mid and distal portion o
f the femoral vein.  On his CT angiography, it was identified patient having the occlusive thrombus 
in the distal right main pulmonary artery extending into the first and second lower lobe and segment
al branches.  Also, a occlusive thrombus in the segmental and subsegmental branches of the left pulm
onary artery.  At the moment, the patient is having some difficulty with his breathing as he is on t
he facemask and cannot talk in full sentences as he has shortness of breath.  He denies chest pain, 
nausea, vomiting, fever or chills.  He mentions that he has left lower extremity discomfort and pain
.

 

PAST MEDICAL HISTORY:  Entails anxiety, depression, chronic back pain, left knee injury.

 

PAST SURGICAL HISTORY:  Back surgery 04/2016.  Left knee meniscus injury repair in January 2017 and 
shoulder surgery.

 

FAMILY HISTORY:  No hypercoagulable state in the family just hypertension.

 

SOCIAL HISTORY:  He is an active smoker and occasional social drinker.  Denies illicit drug use.

 

PHYSICAL EXAMINATION:

GENERAL:  Alert and oriented x3, some difficulty with his breathing.

HEENT:  Normocephalic, atraumatic.  PERRLA, EOMI.  Mucosa moist.

NECK:  Supple.  No carotid bruit.

PULMONARY:  Coarse breath sounds bilaterally with some crackles at the bases.

CARDIOVASCULAR:  S1, S2 present.  No murmurs.

ABDOMEN:  Soft, nontender, nondistended.  Bowel sounds positive.  Truncal obesity.  

EXTREMITIES:  Right lower extremity palpable femoral pulse, palpable pedal pulse.  Motor, sensory in
tact.  Cap refill 2 to 3 seconds.

Left lower extremity palpable femoral pulse, palpable pedal pulse.  Motor, sensory intact.  Capillar
y refill 2 to 3 seconds.  Edema 2+ and tender upon palpation of the calf (Homans' sign).

 

ASSESSMENT AND PLAN:  Left lower extremity deep vein thrombosis of the femoral vein and the bilatera
l pulmonary emboli.   It seems the patient has developed what seems to be a provoked deep vein throm
bosis of his lower extremities with findings of pulmonary embolism.  Unfortunately, as the patient's
 clot seems to be in the distal femoral vein, no further intervention from an endovascular approach 
will be needed.  Further, the patient would not be able to tolerate laying in the prone for any inte
rvention.  It is unclear if the patient has had his left lower extremity longer than 14 days as ther
e will be no benefit for any endovascular intervention to prevent post-thrombotic syndrome the near 
future.

Would recommend for the patient to be placed on anticoagulation as the patient is quite young he see
ms to be active.  We would recommend hypercoagulable workup as being evaluated by our hematology col
leagues.  We will wait for the patient's blood work to return and discuss with the orthopedic surgeo
n if they plan to do any further interventions as an IVC filter may be recommended, if the patient w
ill need surgical intervention and be required to be off his anticoagulation.

Optimize vascular status (BP meds, diet, nutrition, exercise, sugar control, antiplatelets, weight l
oss and anticoagulation).  Discussed findings, plan and management with the patient and his partner 
at the bedside.  Discussed smoking cessation with the patient.

 

Thank you for allowing us to participate in the care of your patient.  Please call with any question
s.

 

 

Dictated By: GUILLERMO TOMAS/SARTHAK

DD:    05/22/2017 14:54:36

DT:    05/22/2017 17:49:09

Conf#: [id number]

DID#:  [id number]

## 2017-05-22 NOTE — PN
DATE:  05/22/2017

 

 

SUBJECTIVE:  The patient still on mask asking about the echocardiogram results.  Wife is at the beds
john.  Denies any significant chest pain.  Was seen by pulmonary and hematology/oncology team earlier
 today.

 

OBJECTIVE

VITAL SIGNS:  Stable, except the patient again is on simple mask 8 liters.

GENERAL:  The patient lying in bed, slightly lethargic but alert, answering questions.

HEENT:  Pupils equal, round and extraocular movements intact.

NECK:  Supple, no thyromegaly.

LUNGS:  Slightly distant breath sounds.

CARDIOVASCULAR:  S1, S2 heard.  No rubs or gallops.

ABDOMEN:  Soft, nontender, nondistended.  Normal bowel sounds.  No rebound or guarding.

MUSCULOSKELETAL:  No lower extremity edema bilaterally.

NEUROLOGIC:  No focal deficits.

 

LABORATORY DATA:  CBC is normal.  The last troponin 0.092, it is normal.  Basic metabolic panel is n
ormal.  A1c is 5.7.

 

ASSESSMENT AND PLAN:  A This is a 52-year-old male status post knee surgery 01/2017 then all on 04/2
2/2017, who presents with left lower extremity deep venous thrombosis and extensive bilateral pulmon
amanda embolism, now on heparin drip.  

1.  Blood clot.  Again, patient has deep venous thrombosis and pulmonary embolism, again Continue he
emmanuelle drip.  Follow up Hematology/Oncology recommendations.  They are doing hypercoagulable workup. 
 It is unclear if patient's clot was from either stasis, specifically after his fall or after surger
y versus possible hypercoagulable issue.  Again, we are working up both of those conditions right no
w.  The patient is still awaiting vascular surgery consult as well.  Seen by pulmonary team already.
  Also, planning for IVC filter placement in the next 24 hours.  The patient will likely need lifelo
ng anticoagulation as well.  Follow up echocardiogram results as well.

2.  Elevated troponin, non-ST elevation myocardial infarction versus secondary to #1.  The patient d
enies any chest pain presently troponins have been trending down.  Either way, the patient is on hep
keyana drip, continue for now.

3.  History of chronic back pain and lower extremity numbness.  Continue current pain medications.  
He is on morphine.  Have to be careful with opiate medications given his respiratory issues right no
w regarding the bilateral pulmonary embolism.

4.  History of chronic anxiety and depression, Ativan p.r.n.

5.  Smoking history.  Counseled on cessation.  Consider PT consult as well.

6.  Gastrointestinal prophylaxis.  H2 blocker.

7.  Deep venous thrombosis prophylaxis, heparin drip.

 

 

Dictated By: MONY HARMON

DD:    05/22/2017 12:15:58

DT:    05/22/2017 12:58:03

Conf#: [id number]

DID#:  [id number]

## 2017-05-22 NOTE — CONS
Date/Time of Note


Date/Time of Note


DATE: 5/22/17 


TIME: 09:35





Assessment/Plan


Assessment/Plan


Chief Complaint/Hosp Course


51 yo male s/p knee surgery in Jan 2017 and then fall on April 22, who now 

presents with LLE DVT and extensive bilateral pulmonary emboli. Although it is 

likely that the VTE is provoked from the recent fall and surgery, it is 

reasonable to perform a hypercoagulable workup given the extensive nature of 

the VTE. 





-will check Factor V Leiden Mutation, Prothrombin Gene Mutation, Protein C 

levels, Protein S levels. 


-although patient is on a heparin drip will check antithrombin III levels 

although this might not be reliable while patient is on a heparin drip


-will need to rule out antiphospholipid syndrome. check for Lupus anticoagulant

, B2 GP and Anticardiolipin antibodies


-cont heparin gtt for now


-Vascular surgery has been consulted. Pt will likely need an IVC filter


-duration of anticoagulation will depend on results of above tests


Problems:  





Consultation Date/Type/Reason


Admit Date/Time


May 21, 2017


Date of Consultation:  May 22, 2017


Type of Consultation:  hematology


Reason for Consultation


B pulmonary emboli and LLE DVT


Referring Provider:  JO LEE





Hx of Present Illness


52-year-old male whose history begins in April 2016 when he underwent back 

surgery for 2 herniated discs. Then towards the end of 2016 he underwent 

surgery for his left knee for a meniscal tear. Then on April 22, 2017 pt states 

he fell after his Left Leg went numb. an MRI was done which demonstrated damage 

done to the hernia repair. Since the fall his LLE has become more swollen, red 

and numb. 2 days prior to admission he began to develop symptoms of shortness 

of breath and pleuritic chest pain.  Pt was sent to the ER where a Ultrasound 

of the LLE was done which demonstrated an occlusive acute deep venous 

thrombosis of the left lower extremity involving the mid and distal portions of 

the femoral vein as well as the popliteal vein. A Ct Angiogram was done which 

demonstrates extensive bilateral pulmonary emboli with near occlusive thrombus 

distal right main pulmonary artery extending into first and second order lobe 

are and segmental branches. Also seen was a partially occlusive thrombi in the 

segmental and subsegmental branches of the left pulmonary artery. We have been 

consulted for further workup of the extensive VTE.


Constitutional:  no complaints


Eyes:  no complaints


ENT:  no complaints


Respiratory:  pleuritic pain, shortness of breath


Cardiovascular:  chest pain


Genitourinary:  no complaints


Musculoskeletal:  back pain, bone/joint pain


Skin:  no complaints


Neurologic:  other (LLE numbness)


Endocrine:  no complaints





Past Medical History


* Anxiety


* depression 


* chronic back pain











Past Surgical History


* Back surgery April 2016


* Left knee meniscal repair January 2017


* Shoulder surgery





Family History


Significant Family History:  no pertinent family hx





Social History


Alcohol Use:  occasionally


Smoking Status:  Current every day smoker


Drug Use:  none





Exam/Review of Systems


Vital Signs


Vitals





 Vital Signs








  Date Time  Temp Pulse Resp B/P Pulse Ox O2 Delivery O2 Flow Rate FiO2


 


5/22/17 08:11  66      


 


5/22/17 07:19 98.3  20 119/73 99   


 


5/21/17 20:29      Simple Mask 8.0 














 Intake and Output   


 


 5/21/17 5/21/17 5/22/17





 15:00 23:00 07:00


 


Intake Total   900 ml


 


Balance   900 ml











Exam


Constitutional:  alert, oriented


Psych:  anxiety, depression


Head:  normocephalic


Eyes:  nl conjunctiva


ENMT:  nl external ears & nose


Neck:  non-tender, supple


Respiratory:  clear to auscultation, normal air movement


Cardiovascular:  nl pulses, regular rate and rhythm


Gastrointestinal:  soft


Musculoskeletal:  swelling (LLE mild swelling. LLE warm. Less edematous than 

yesterday)


Extremities:  normal pulses


Neurological:  CNS II-XII intact





Results


Result Diagram:  


5/22/17 0430                                                                   

             5/22/17 0430





Results 24 hrs





Laboratory Tests








Test


  5/21/17


11:10 5/21/17


19:15 5/21/17


21:05 5/22/17


01:06


 


White Blood Count 12.2  #H   


 


Red Blood Count 5.29     


 


Hemoglobin 15.4     


 


Hematocrit 47.3     


 


Mean Corpuscular Volume 89.4     


 


Mean Corpuscular Hemoglobin 29.1     


 


Mean Corpuscular Hemoglobin


Concent 32.6  


  


  


  


 


 


Red Cell Distribution Width 13.0     


 


Platelet Count 179     


 


Mean Platelet Volume 10.2  #   


 


Neutrophils % 70.4     


 


Lymphocytes % 17.3     


 


Monocytes % 10.0     


 


Eosinophils % 1.1     


 


Basophils % 0.6     


 


Nucleated Red Blood Cells % 0.0     


 


Neutrophils # 8.6  H   


 


Lymphocytes # 2.1     


 


Monocytes # 1.2  H   


 


Eosinophils # 0.1     


 


Basophils # 0.1     


 


Nucleated Red Blood Cells # 0.0     


 


Prothrombin Time 13.9    14.3  H 


 


Prothrombin Time Ratio 1.1    1.1   


 


INR International Normalized


Ratio 1.07  


  


  1.11  


  


 


 


Activated Partial


Thromboplast Time 28.6  


  


  98.2  *H


  


 


 


Sodium Level 143     


 


Potassium Level 4.5     


 


Chloride Level 105     


 


Carbon Dioxide Level 25     


 


Anion Gap 18  H   


 


Blood Urea Nitrogen 14     


 


Creatinine 1.14     


 


Glucose Level 117     


 


Calcium Level 9.2     


 


Total Bilirubin 0.4     


 


Direct Bilirubin 0.00     


 


Indirect Bilirubin 0.4     


 


Aspartate Amino Transf


(AST/SGOT) 21  


  


  


  


 


 


Alanine Aminotransferase


(ALT/SGPT) 34  


  


  


  


 


 


Alkaline Phosphatase 75     


 


Troponin I 0.344  *H 0.122  *H  0.092  


 


B-Type Natriuretic Peptide 2200  H   


 


Total Protein 7.2     


 


Albumin 4.0     


 


Globulin 3.20     


 


Albumin/Globulin Ratio 1.25     


 


Creatine Kinase  62    55  


 


Creatine Kinase Index  1.8    1.8  


 


Creatinine Kinase MB (Mass)  1.14    0.99  














Test


  5/22/17


04:30 


  


  


 


 


White Blood Count 8.1  #   


 


Red Blood Count 4.49  L   


 


Hemoglobin 12.9  L   


 


Hematocrit 40.9  L   


 


Mean Corpuscular Volume 91.1     


 


Mean Corpuscular Hemoglobin 28.7  L   


 


Mean Corpuscular Hemoglobin


Concent 31.5  L


  


  


  


 


 


Red Cell Distribution Width 13.2     


 


Platelet Count 146     


 


Mean Platelet Volume 10.5  H   


 


Neutrophils % 63.8     


 


Lymphocytes % 21.0     


 


Monocytes % 10.7     


 


Eosinophils % 3.1     


 


Basophils % 0.7     


 


Nucleated Red Blood Cells % 0.0     


 


Neutrophils # 5.2     


 


Lymphocytes # 1.7     


 


Monocytes # 0.9     


 


Eosinophils # 0.3     


 


Basophils # 0.1     


 


Nucleated Red Blood Cells # 0.0     


 


Activated Partial


Thromboplast Time 106.0  *H


  


  


  


 


 


Sodium Level 140     


 


Potassium Level 4.5     


 


Chloride Level 105     


 


Carbon Dioxide Level 26     


 


Anion Gap 14     


 


Blood Urea Nitrogen 17     


 


Creatinine 1.15     


 


Glucose Level 99     


 


Hemoglobin A1c 5.7     


 


Calcium Level 8.4     


 


Phosphorus Level 4.8     


 


Magnesium Level 2.0     


 


Albumin 3.0  #L   


 


Thyroid Stimulating Hormone


(TSH) 1.820  


  


  


  


 











Medications


Medications





 Current Medications


Buspirone HCl (Buspar) 10 mg BID PO  Last administered on 5/22/17at 08:46; 

Admin Dose 10 MG;  Start 5/21/17 at 21:00


Pregabalin (Lyrica) 75 mg BID PO  Last administered on 5/22/17at 08:42; Admin 

Dose 75 MG;  Start 5/21/17 at 21:00


Morphine Sulfate (morphine) 2 mg Q4H  PRN IV pain Last administered on 5/22/ 17at 08:48; Admin Dose 2 MG;  Start 5/21/17 at 18:00


Polyethylene Glycol (Miralax) 8.5 gm DAILY PO ;  Start 5/22/17 at 09:00


Famotidine (Pepcid Iv) 20 mg BID IV  Last administered on 5/22/17at 08:41; 

Admin Dose 20 MG;  Start 5/21/17 at 21:00


Aspirin (Halfprin) 81 mg DAILY PO  Last administered on 5/22/17at 08:42; Admin 

Dose 81 MG;  Start 5/22/17 at 09:00


Atorvastatin Calcium (Lipitor) 20 mg HS PO  Last administered on 5/21/17at 21:08

; Admin Dose 20 MG;  Start 5/21/17 at 21:00


Methocarbamol (Robaxin) 500 mg BID  PRN PO msc spasm Last administered on 5/22/ 17at 08:47; Admin Dose 500 MG;  Start 5/21/17 at 18:30


Ondansetron HCl (Zofran Inj) 4 mg Q6H  PRN IV NAUSEA AND/OR VOMITING;  Start 5/ 21/17 at 18:30


Amoxicillin (Amoxicillin) 500 mg Q8 PO  Last administered on 5/22/17at 05:59; 

Admin Dose 500 MG;  Start 5/21/17 at 22:00


Nitroglycerin (Nitroglycerin (Sl Tab) 0.4 Mg) 1 tab Q5M  PRN SL ANGINA Last 

administered on 5/22/17at 07:31; Admin Dose 1 TAB;  Start 5/21/17 at 22:00











YEVGENIY TSAI M.D. May 22, 2017 09:48

## 2017-05-23 VITALS — SYSTOLIC BLOOD PRESSURE: 178 MMHG | RESPIRATION RATE: 18 BRPM | DIASTOLIC BLOOD PRESSURE: 96 MMHG

## 2017-05-23 VITALS — SYSTOLIC BLOOD PRESSURE: 115 MMHG | RESPIRATION RATE: 16 BRPM | DIASTOLIC BLOOD PRESSURE: 68 MMHG

## 2017-05-23 VITALS — RESPIRATION RATE: 20 BRPM | DIASTOLIC BLOOD PRESSURE: 81 MMHG | SYSTOLIC BLOOD PRESSURE: 122 MMHG

## 2017-05-23 VITALS — HEART RATE: 56 BPM

## 2017-05-23 VITALS — DIASTOLIC BLOOD PRESSURE: 67 MMHG | RESPIRATION RATE: 18 BRPM | SYSTOLIC BLOOD PRESSURE: 108 MMHG

## 2017-05-23 VITALS — HEART RATE: 66 BPM

## 2017-05-23 VITALS — HEART RATE: 70 BPM

## 2017-05-23 VITALS — SYSTOLIC BLOOD PRESSURE: 104 MMHG | RESPIRATION RATE: 18 BRPM | DIASTOLIC BLOOD PRESSURE: 68 MMHG

## 2017-05-23 VITALS — RESPIRATION RATE: 18 BRPM | DIASTOLIC BLOOD PRESSURE: 55 MMHG | SYSTOLIC BLOOD PRESSURE: 108 MMHG

## 2017-05-23 VITALS — HEART RATE: 69 BPM

## 2017-05-23 VITALS — HEART RATE: 52 BPM

## 2017-05-23 VITALS — HEART RATE: 59 BPM

## 2017-05-23 LAB
ADD SCAN DIFF: NO
BASOPHILS # BLD AUTO: 0.1 10^3/UL (ref 0–0.1)
BASOPHILS NFR BLD: 0.6 % (ref 0–2)
CHOLEST SERPL-MCNC: 106 MG/DL (ref 100–200)
CHOLEST/HDLC SERPL: 3.9 RATIO
EOSINOPHIL # BLD: 0.2 10^3/UL (ref 0–0.5)
EOSINOPHIL NFR BLD: 2 % (ref 0–7)
ERYTHROCYTE [DISTWIDTH] IN BLOOD BY AUTOMATED COUNT: 13 % (ref 11.5–14.5)
HCT VFR BLD CALC: 41 % (ref 42–52)
HDLC SERPL-MCNC: 27 MG/DL (ref 28–71)
HGB BLD-MCNC: 12.9 G/DL (ref 14–18)
LYMPHOCYTES # BLD AUTO: 1.7 10^3/UL (ref 0.8–2.9)
LYMPHOCYTES NFR BLD AUTO: 18.6 % (ref 15–51)
MCH RBC QN AUTO: 28.7 PG (ref 29–33)
MCHC RBC AUTO-ENTMCNC: 31.5 G/DL (ref 32–37)
MCV RBC AUTO: 91.3 FL (ref 82–101)
MONOCYTES # BLD: 1.2 10^3/UL (ref 0.3–0.9)
MONOCYTES NFR BLD: 12.4 % (ref 0–11)
NEUTROPHILS # BLD: 6.2 10^3/UL (ref 1.6–7.5)
NEUTROPHILS NFR BLD AUTO: 65.9 % (ref 39–77)
NRBC # BLD MANUAL: 0 10^3/UL (ref 0–0)
NRBC BLD QL: 0 /100WBC (ref 0–0)
PLATELET # BLD: 157 10^3/UL (ref 140–415)
PMV BLD AUTO: 10.5 FL (ref 7.4–10.4)
RBC # BLD AUTO: 4.49 10^6/UL (ref 4.7–6.1)
TRIGL SERPL-MCNC: 89 MG/DL (ref 0–149)
TROPONIN I SERPL-MCNC: 0.03 NG/ML (ref 0–0.12)
WBC # BLD AUTO: 9.4 10^3/UL (ref 4.8–10.8)

## 2017-05-23 RX ADMIN — NITROGLYCERIN PRN TAB: 0.4 TABLET SUBLINGUAL at 16:01

## 2017-05-23 RX ADMIN — ASPIRIN SCH MG: 81 TABLET, COATED ORAL at 08:09

## 2017-05-23 RX ADMIN — KETOROLAC TROMETHAMINE PRN MG: 30 INJECTION, SOLUTION INTRAMUSCULAR at 13:59

## 2017-05-23 RX ADMIN — BUSPIRONE HYDROCHLORIDE SCH MG: 10 TABLET ORAL at 08:09

## 2017-05-23 RX ADMIN — METOPROLOL TARTRATE SCH MG: 25 TABLET, FILM COATED ORAL at 08:20

## 2017-05-23 RX ADMIN — BUSPIRONE HYDROCHLORIDE SCH MG: 10 TABLET ORAL at 20:21

## 2017-05-23 RX ADMIN — NITROGLYCERIN PRN TAB: 0.4 TABLET SUBLINGUAL at 10:54

## 2017-05-23 RX ADMIN — FAMOTIDINE SCH MG: 20 TABLET ORAL at 20:21

## 2017-05-23 RX ADMIN — ATORVASTATIN CALCIUM SCH MG: 20 TABLET, FILM COATED ORAL at 20:21

## 2017-05-23 RX ADMIN — PREGABALIN SCH MG: 75 CAPSULE ORAL at 20:21

## 2017-05-23 RX ADMIN — KETOROLAC TROMETHAMINE PRN MG: 30 INJECTION, SOLUTION INTRAMUSCULAR at 08:10

## 2017-05-23 RX ADMIN — PREGABALIN SCH MG: 75 CAPSULE ORAL at 08:09

## 2017-05-23 RX ADMIN — MORPHINE SULFATE PRN MG: 2 INJECTION, SOLUTION INTRAMUSCULAR; INTRAVENOUS at 16:06

## 2017-05-23 RX ADMIN — KETOROLAC TROMETHAMINE PRN MG: 30 INJECTION, SOLUTION INTRAMUSCULAR at 20:21

## 2017-05-23 RX ADMIN — METOPROLOL TARTRATE SCH MG: 25 TABLET, FILM COATED ORAL at 20:20

## 2017-05-23 RX ADMIN — NITROGLYCERIN PRN TAB: 0.4 TABLET SUBLINGUAL at 17:04

## 2017-05-23 RX ADMIN — KETOROLAC TROMETHAMINE PRN MG: 30 INJECTION, SOLUTION INTRAMUSCULAR at 02:02

## 2017-05-23 RX ADMIN — METHOCARBAMOL PRN MG: 500 TABLET ORAL at 08:09

## 2017-05-23 RX ADMIN — POLYETHYLENE GLYCOL 3350 SCH GM: 17 POWDER, FOR SOLUTION ORAL at 08:21

## 2017-05-23 RX ADMIN — DIPHENHYDRAMINE HYDROCHLORIDE SCH MG: 50 INJECTION, SOLUTION INTRAMUSCULAR; INTRAVENOUS at 08:09

## 2017-05-23 NOTE — RADRPT
Echocardiogram Report

 

Patient Name:  ABDELRAHMAN INGRAM  Gender:       Male

MRN:           [id number]        Accession #:  [id number]

Birth Date:    1965    Study Date:   22-May-2017

Sonographer:   RADHA Gonzalez [facility]   Location:     [id number]

 

Ref. Physician: JO LEE

Quality: Adequate

 

Procedures: Transthoracic echocardiogram with complete 2D, M-Mode, and 

doppler examination.

Indications: Elevated troponin.

 

2D/M Mode                          Doppler

Measurement  Value  Normal Ranges  Measurement    Value  Normal Ranges

LVIDd 2D     4.3    3.5 - 5.6 cm   AV Peak Jonathan    1.7    m/sec

LVIDs 2D     2.4    2.1 - 4.1 cm   AV Peak PG     11.0   mmHg

FS 2D        44.6   %              LVOT Peak Jonathan  1.3    m/sec

LVPWd 2D     1.1    0.6 - 1.1 cm   LVOT Peak PG   7.0    mmHg

IVSd 2D      1.1    0.6 - 1.1 cm   MV E Peak Jonathan  0.4    m/sec

IVS/LVPW 2D  1.0                   MV A Peak Jonathan  0.6    m/sec

AoR Diam 2D  2.9    2.0 - 3.7 cm   MV E/A         0.7

LA/Ao 2D     1      0 - 1          MV Decel Time  236    msec

EDV 2D       78.4   cm3            MV E/A         0.7

ESV 2D       13.3   cm3            TR Peak Jonathan    3.0    m/sec

LA Dimen 2D  3.5    2.3 - 4.0 cm   TR Peak PG     36.0   mmHg

RVSP           39.0   mmHg

 

Findings

Left Ventricle: Normal left ventricular systolic function.  Normal left 

ventricular cavity size.  Normal left ventricular wall thickness.  

Ejection fraction is visually estimated at 55 %.  Tissue Doppler/Mitral 

Doppler indices are consistent with impaired relaxation (Stage I 

diastolic dysfunction).

Right Ventricle: Normal right ventricular size.  Normal right 

ventricular systolic function.

Left Atrium: The left atrium is normal in size.

Right Atrium: The right atrium is normal in size.

Mitral Valve: Normal appearance and function of the mitral valve with 

trace physiologic regurgitation.

Aortic Valve: No significant aortic stenosis or insufficiency.  Aortic 

cusps appear mildly calcified.

Tricuspid Valve: Normal appearance of the tricuspid valve.  Estimated 

peak PA systolic pressure 39 mmHg.  There is trace tricuspid 

regurgitation.

Pulmonic Valve: Normal pulmonic valve appearance.  There is trace 

pulmonic regurgitation.

Pericardium: Normal pericardium with no significant pericardial effusion.

Aorta: Normal aortic root.

IVC: Normal size and normal respiratory collapse consistent with normal 

right atrial pressure.

 

Conclusions

1.Normal left ventricular systolic function.  Normal left ventricular 

cavity size.  Normal left ventricular wall thickness.  Ejection fraction 

is visually estimated at 55 %.  Tissue Doppler/Mitral Doppler indices 

are consistent with impaired relaxation (Stage I diastolic dysfunction).

2.Normal appearance and function of the mitral valve with trace 

physiologic regurgitation.

3.Normal appearance of the tricuspid valve.  Estimated peak PA systolic 

pressure 39 mmHg.  There is trace tricuspid regurgitation.

4.Normal pulmonic valve appearance.  There is trace pulmonic 

regurgitation.

 

Electronically Signed By:

Serafin Fine

23-May-2017 00:05:24  -0700

 

Patient Name: ABDELRAHMAN INGRAM

MRN: [id number]

Study Date: 22-May-2017

 

[id number]

## 2017-05-23 NOTE — CONS
Date/Time of Note


Date/Time of Note


DATE: 5/23/17 


TIME: 11:02





Assessment/Plan


Assessment/Plan


Additional Assessment/Plan


2D echocardiogram is not indicating any right ventricular strain.





Assessment recommendations;





1.  Patient admitted for bilateral PE.


2.  Chronic pain, status post back surgery.





Continue current treatment.  Start Coumadin in 24 hours.  Patient also is a 

candidate for Xarelto or apixaban administration.  Currently there is no 

clinical indication for placement of a [place] filter.





Consultation Date/Type/Reason


Admit Date/Time


May 21, 2017 at 14:45


Initial Consult Date


5/22/17


Type of Consultation:  Pulmonary


Referring Provider:  JO LEE





24 HR Interval Summary


Free Text/Dictation


Patient is feeling somewhat better.  Still complains of shortness of breath and 

sharp left sided chest pain upon deep breathing.  Complains of chronic pain.





General exam; young male, awake alert currently in no distress.





Exam/Review of Systems


Vital Signs


Vitals





 Vital Signs








  Date Time  Temp Pulse Resp B/P Pulse Ox O2 Delivery O2 Flow Rate FiO2


 


5/23/17 08:21  56      


 


5/23/17 07:56      Simple Mask 8.0 


 


5/23/17 07:14 97.8  18 108/67 100   














 Intake and Output   


 


 5/22/17 5/22/17 5/23/17





 15:00 23:00 07:00


 


Intake Total  960 ml 650 ml


 


Output Total  1300 ml 1100 ml


 


Balance  -340 ml -450 ml











Exam


HEENT examination; supple neck, no JVD.  No lymphadenopathy.  Midline trachea.  

No thyromegaly.  Pharynx is clear.





Chest examination; clear to auscultation.  S1-S2 audible, no murmurs.  Regular 

rhythm.





Abdomen examination; soft, protuberant.  No organomegaly.  Bowel sounds 

audible.  





Extremity exam; no peripheral edema.  Pulses 2+ bilaterally.





CNS examination; no focal deficit.





Results


Result Diagram:  


5/23/17 0630                                                                   

             5/22/17 0430





Results 24 hrs





Laboratory Tests








Test


  5/22/17


12:30 5/22/17


18:20 5/23/17


06:30


 


Activated Partial


Thromboplast Time 71.7  *H


  82.2  *H


  75.6  *H


 


 


White Blood Count   9.4  


 


Red Blood Count   4.49  L


 


Hemoglobin   12.9  L


 


Hematocrit   41.0  L


 


Mean Corpuscular Volume   91.3  


 


Mean Corpuscular Hemoglobin   28.7  L


 


Mean Corpuscular Hemoglobin


Concent 


  


  31.5  L


 


 


Red Cell Distribution Width   13.0  


 


Platelet Count   157  


 


Mean Platelet Volume   10.5  H


 


Neutrophils %   65.9  


 


Lymphocytes %   18.6  


 


Monocytes %   12.4  H


 


Eosinophils %   2.0  


 


Basophils %   0.6  


 


Nucleated Red Blood Cells %   0.0  


 


Neutrophils #   6.2  


 


Lymphocytes #   1.7  


 


Monocytes #   1.2  H


 


Eosinophils #   0.2  


 


Basophils #   0.1  


 


Nucleated Red Blood Cells #   0.0  


 


Troponin I   0.027  


 


Triglycerides Level   89  


 


Cholesterol Level   106  


 


LDL Cholesterol, Calculated   61  


 


HDL Cholesterol   27  L


 


Cholesterol/HDL Ratio   3.9  











Medications


Medications





 Current Medications


Buspirone HCl (Buspar) 10 mg BID PO  Last administered on 5/23/17at 08:09; 

Admin Dose 10 MG;  Start 5/21/17 at 21:00


Pregabalin (Lyrica) 75 mg BID PO  Last administered on 5/23/17at 08:09; Admin 

Dose 75 MG;  Start 5/21/17 at 21:00


Morphine Sulfate (morphine) 2 mg Q4H  PRN IV pain Last administered on 5/22/ 17at 20:51; Admin Dose 2 MG;  Start 5/21/17 at 18:00


Polyethylene Glycol (Miralax) 8.5 gm DAILY PO ;  Start 5/22/17 at 09:00


Famotidine (Pepcid Iv) 20 mg BID IV  Last administered on 5/23/17at 08:09; 

Admin Dose 20 MG;  Start 5/21/17 at 21:00


Aspirin (Halfprin) 81 mg DAILY PO  Last administered on 5/23/17at 08:09; Admin 

Dose 81 MG;  Start 5/22/17 at 09:00


Atorvastatin Calcium (Lipitor) 20 mg HS PO  Last administered on 5/22/17at 20:51

; Admin Dose 20 MG;  Start 5/21/17 at 21:00


Methocarbamol (Robaxin) 500 mg BID  PRN PO msc spasm Last administered on 5/23/ 17at 08:09; Admin Dose 500 MG;  Start 5/21/17 at 18:30


Ondansetron HCl (Zofran Inj) 4 mg Q6H  PRN IV NAUSEA AND/OR VOMITING;  Start 5/ 21/17 at 18:30


Amoxicillin (Amoxicillin) 500 mg Q8 PO  Last administered on 5/23/17at 06:15; 

Admin Dose 500 MG;  Start 5/21/17 at 22:00


Nitroglycerin (Nitroglycerin (Sl Tab) 0.4 Mg) 1 tab Q5M  PRN SL ANGINA Last 

administered on 5/23/17at 10:54; Admin Dose 1 TAB;  Start 5/21/17 at 22:00


Metoprolol Tartrate (Lopressor) 25 mg BID PO ;  Start 5/22/17 at 21:00


Morphine Sulfate (morphine) 4 mg Q4H  PRN IM PAIN LEVEL 6-10 Last administered 

on 5/22/17at 23:28; Admin Dose 4 MG;  Start 5/22/17 at 23:00


Ketorolac Tromethamine (Toradol) 30 mg Q6H  PRN IV PAIN Last administered on 5/ 23/17at 08:10; Admin Dose 30 MG;  Start 5/23/17 at 02:00;  Stop 5/26/17 at 01:59











SHIRIN LUCIO May 23, 2017 11:04

## 2017-05-23 NOTE — RADRPT
Vent Rate: 51 bpm

RR Interval: 0 msec

RI Interval: 172 msec

QRS Duration: 90 msec

QT Interval: 430 msec

QTC Interval: 396 msec

P-R-T Axis: 42 - 7 - 45 degrees

 

 Sinus bradycardia

 Otherwise normal ECG

 

Electronically Signed By: Albaro Shabazz

[id number]

## 2017-05-23 NOTE — CONS
Date/Time of Note


Date/Time of Note


DATE: 5/23/17 


TIME: 12:18





Assessment/Plan


Assessment/Plan


Additional Assessment/Plan


1.  Positive troponin in the setting of bilateral pulmonary emboli- no CP now, 

med rx for now. 


2.  Chest pain in the setting of bilateral pulmonary emboli - see above. 


3.  Dyspnea on exertion with known bilateral pulmonary emboli - on anti-

coagulation now. 


4.  Lower extremity edema with known lower extremity deep venous thrombosis- ? 

IVCF. 


5.  Anemia, mild.


6.  Ongoing tobacco usage- d/c advised ASAP.





Consultation Date/Type/Reason


Admit Date/Time


May 21, 2017 at 14:45


Initial Consult Date


5/22/17


Type of Consultation:  hematology


Referring Provider:  JO LEE





24 HR Interval Summary


Free Text/Dictation


No acute change - no significant ectopy on tele. 





ROS: No fever, no chills, no nausea, no vomiting, no diarrhea/constipation


        No recent weight changes


        No chest pain, no PND, no orthopnea


        No dizziness, blurred vision


        No thirst, no heat or cold intolerance





Exam/Review of Systems


Vital Signs


Vitals





 Vital Signs








  Date Time  Temp Pulse Resp B/P Pulse Ox O2 Delivery O2 Flow Rate FiO2


 


5/23/17 11:09 97.7 65 18 108/55 98   


 


5/23/17 07:56      Simple Mask 8.0 














 Intake and Output   


 


 5/22/17 5/22/17 5/23/17





 15:00 23:00 07:00


 


Intake Total  960 ml 650 ml


 


Output Total  1300 ml 1100 ml


 


Balance  -340 ml -450 ml











Exam


General: WN/WD/NAD, AOx 3


HEENT: Unicetric/atraumatic/EOMI (follows commands)


NECK: JVD elevated, no thyromegaly


Lymph: no lymphadenopathy


HEART: regular with no S3, II/VI systolic murmur at apex


LUNGS: Coarse sounds


ABD: soft, NT, ND, +BS


: Intact


Neuro: non focal


SKIN: chronic changes


EXT: trace edema





Results


Result Diagram:  


5/23/17 0630                                                                   

             5/22/17 0430





Results 24 hrs





Laboratory Tests








Test


  5/22/17


12:30 5/22/17


18:20 5/23/17


06:30


 


Activated Partial


Thromboplast Time 71.7  *H


  82.2  *H


  75.6  *H


 


 


White Blood Count   9.4  


 


Red Blood Count   4.49  L


 


Hemoglobin   12.9  L


 


Hematocrit   41.0  L


 


Mean Corpuscular Volume   91.3  


 


Mean Corpuscular Hemoglobin   28.7  L


 


Mean Corpuscular Hemoglobin


Concent 


  


  31.5  L


 


 


Red Cell Distribution Width   13.0  


 


Platelet Count   157  


 


Mean Platelet Volume   10.5  H


 


Neutrophils %   65.9  


 


Lymphocytes %   18.6  


 


Monocytes %   12.4  H


 


Eosinophils %   2.0  


 


Basophils %   0.6  


 


Nucleated Red Blood Cells %   0.0  


 


Neutrophils #   6.2  


 


Lymphocytes #   1.7  


 


Monocytes #   1.2  H


 


Eosinophils #   0.2  


 


Basophils #   0.1  


 


Nucleated Red Blood Cells #   0.0  


 


Troponin I   0.027  


 


Triglycerides Level   89  


 


Cholesterol Level   106  


 


LDL Cholesterol, Calculated   61  


 


HDL Cholesterol   27  L


 


Cholesterol/HDL Ratio   3.9  











Medications


Medications





 Current Medications


Buspirone HCl (Buspar) 10 mg BID PO  Last administered on 5/23/17at 08:09; 

Admin Dose 10 MG;  Start 5/21/17 at 21:00


Pregabalin (Lyrica) 75 mg BID PO  Last administered on 5/23/17at 08:09; Admin 

Dose 75 MG;  Start 5/21/17 at 21:00


Morphine Sulfate (morphine) 2 mg Q4H  PRN IV pain Last administered on 5/22/ 17at 20:51; Admin Dose 2 MG;  Start 5/21/17 at 18:00


Polyethylene Glycol (Miralax) 8.5 gm DAILY PO ;  Start 5/22/17 at 09:00


Famotidine (Pepcid Iv) 20 mg BID IV  Last administered on 5/23/17at 08:09; 

Admin Dose 20 MG;  Start 5/21/17 at 21:00


Aspirin (Halfprin) 81 mg DAILY PO  Last administered on 5/23/17at 08:09; Admin 

Dose 81 MG;  Start 5/22/17 at 09:00


Atorvastatin Calcium (Lipitor) 20 mg HS PO  Last administered on 5/22/17at 20:51

; Admin Dose 20 MG;  Start 5/21/17 at 21:00


Methocarbamol (Robaxin) 500 mg BID  PRN PO msc spasm Last administered on 5/23/ 17at 08:09; Admin Dose 500 MG;  Start 5/21/17 at 18:30


Ondansetron HCl (Zofran Inj) 4 mg Q6H  PRN IV NAUSEA AND/OR VOMITING;  Start 5/ 21/17 at 18:30


Amoxicillin (Amoxicillin) 500 mg Q8 PO  Last administered on 5/23/17at 06:15; 

Admin Dose 500 MG;  Start 5/21/17 at 22:00


Nitroglycerin (Nitroglycerin (Sl Tab) 0.4 Mg) 1 tab Q5M  PRN SL ANGINA Last 

administered on 5/23/17at 10:54; Admin Dose 1 TAB;  Start 5/21/17 at 22:00


Metoprolol Tartrate (Lopressor) 25 mg BID PO ;  Start 5/22/17 at 21:00


Morphine Sulfate (morphine) 4 mg Q4H  PRN IM PAIN LEVEL 6-10 Last administered 

on 5/22/17at 23:28; Admin Dose 4 MG;  Start 5/22/17 at 23:00


Ketorolac Tromethamine (Toradol) 30 mg Q6H  PRN IV PAIN Last administered on 5/ 23/17at 08:10; Admin Dose 30 MG;  Start 5/23/17 at 02:00;  Stop 5/26/17 at 01:59











KRISHNA CARRERA MD May 23, 2017 12:20

## 2017-05-23 NOTE — CONS
Date/Time of Note


Date/Time of Note


DATE: 5/23/17 


TIME: 11:09





Assessment/Plan


Assessment/Plan


Chief Complaint/Hosp Course


53 yo male s/p knee surgery in Jan 2017 and then fall on April 22, who now 

presents with LLE DVT and extensive bilateral pulmonary emboli. Although it is 

likely that the VTE is provoked from the recent fall and surgery, it is 

reasonable to perform a hypercoagulable workup given the extensive nature of 

the VTE. 








-f/u Factor V Leiden Mutation, Prothrombin Gene Mutation, Protein C levels, 

Protein S levels. 


-although patient is on a heparin drip will check antithrombin III levels 

although this might not be reliable while patient is on a heparin drip


-will need to rule out antiphospholipid syndrome. check for Lupus anticoagulant

, B2 GP and Anticardiolipin antibodies


-cont heparin gtt for now


-appreciate vascular surgery and pulmonary recs. given that patient has not 

failed anticoagulation and he stable from a cardiac/ pulmonary standpoint , we 

can hold on an IVC filter at this time. If he needs surgery in the near future 

we can place a temporary filter at that time


Problems:  


(1) DVT (deep venous thrombosis)


Status:  Acute


Qualifiers:  


   DVT location:  lower extremity  Affected thrombotic vein of extremity:  

popliteal  Laterality:  left  Chronicity:  acute  Qualified Code:  I82.432 - 

Acute deep vein thrombosis (DVT) of popliteal vein of left lower extremity


(2) Bilateral pulmonary embolism


Status:  Acute





Consultation Date/Type/Reason


Admit Date/Time


May 21, 2017 at 14:45


Initial Consult Date


5/22/17


Type of Consultation:  hematology


Reason for Consultation


LLE DVT and multiple bilateral pulmonary emboli


Referring Provider:  JO LEE





24 HR Interval Summary


Free Text/Dictation


pt had repeated episodes of chest pain last night. echocardiogram done. EF ok. 

chest pain has subsided but still states its 6/10.





Exam/Review of Systems


Vital Signs


Vitals





 Vital Signs








  Date Time  Temp Pulse Resp B/P Pulse Ox O2 Delivery O2 Flow Rate FiO2


 


5/23/17 08:21  56      


 


5/23/17 07:56      Simple Mask 8.0 


 


5/23/17 07:14 97.8  18 108/67 100   














 Intake and Output   


 


 5/22/17 5/22/17 5/23/17





 15:00 23:00 07:00


 


Intake Total  960 ml 650 ml


 


Output Total  1300 ml 1100 ml


 


Balance  -340 ml -450 ml











Exam


Constitutional:  alert, oriented


Psych:  no complaints


Head:  normocephalic


Eyes:  nl conjunctiva


ENMT:  nl external ears & nose


Neck:  non-tender, supple


Respiratory:  clear to auscultation, normal air movement


Cardiovascular:  regular rate and rhythm


Gastrointestinal:  soft


Musculoskeletal:  nl extremities to inspection, nl gait and stance, other (LLE 

pain and swelling have subsided)





Results


Result Diagram:  


5/23/17 0630                                                                   

             5/22/17 0430





Results 24 hrs





Laboratory Tests








Test


  5/22/17


12:30 5/22/17


18:20 5/23/17


06:30


 


Activated Partial


Thromboplast Time 71.7  *H


  82.2  *H


  75.6  *H


 


 


White Blood Count   9.4  


 


Red Blood Count   4.49  L


 


Hemoglobin   12.9  L


 


Hematocrit   41.0  L


 


Mean Corpuscular Volume   91.3  


 


Mean Corpuscular Hemoglobin   28.7  L


 


Mean Corpuscular Hemoglobin


Concent 


  


  31.5  L


 


 


Red Cell Distribution Width   13.0  


 


Platelet Count   157  


 


Mean Platelet Volume   10.5  H


 


Neutrophils %   65.9  


 


Lymphocytes %   18.6  


 


Monocytes %   12.4  H


 


Eosinophils %   2.0  


 


Basophils %   0.6  


 


Nucleated Red Blood Cells %   0.0  


 


Neutrophils #   6.2  


 


Lymphocytes #   1.7  


 


Monocytes #   1.2  H


 


Eosinophils #   0.2  


 


Basophils #   0.1  


 


Nucleated Red Blood Cells #   0.0  


 


Troponin I   0.027  


 


Triglycerides Level   89  


 


Cholesterol Level   106  


 


LDL Cholesterol, Calculated   61  


 


HDL Cholesterol   27  L


 


Cholesterol/HDL Ratio   3.9  











Medications


Medications





 Current Medications


Buspirone HCl (Buspar) 10 mg BID PO  Last administered on 5/23/17at 08:09; 

Admin Dose 10 MG;  Start 5/21/17 at 21:00


Pregabalin (Lyrica) 75 mg BID PO  Last administered on 5/23/17at 08:09; Admin 

Dose 75 MG;  Start 5/21/17 at 21:00


Morphine Sulfate (morphine) 2 mg Q4H  PRN IV pain Last administered on 5/22/ 17at 20:51; Admin Dose 2 MG;  Start 5/21/17 at 18:00


Polyethylene Glycol (Miralax) 8.5 gm DAILY PO ;  Start 5/22/17 at 09:00


Famotidine (Pepcid Iv) 20 mg BID IV  Last administered on 5/23/17at 08:09; 

Admin Dose 20 MG;  Start 5/21/17 at 21:00


Aspirin (Halfprin) 81 mg DAILY PO  Last administered on 5/23/17at 08:09; Admin 

Dose 81 MG;  Start 5/22/17 at 09:00


Atorvastatin Calcium (Lipitor) 20 mg HS PO  Last administered on 5/22/17at 20:51

; Admin Dose 20 MG;  Start 5/21/17 at 21:00


Methocarbamol (Robaxin) 500 mg BID  PRN PO msc spasm Last administered on 5/23/ 17at 08:09; Admin Dose 500 MG;  Start 5/21/17 at 18:30


Ondansetron HCl (Zofran Inj) 4 mg Q6H  PRN IV NAUSEA AND/OR VOMITING;  Start 5/ 21/17 at 18:30


Amoxicillin (Amoxicillin) 500 mg Q8 PO  Last administered on 5/23/17at 06:15; 

Admin Dose 500 MG;  Start 5/21/17 at 22:00


Nitroglycerin (Nitroglycerin (Sl Tab) 0.4 Mg) 1 tab Q5M  PRN SL ANGINA Last 

administered on 5/23/17at 10:54; Admin Dose 1 TAB;  Start 5/21/17 at 22:00


Metoprolol Tartrate (Lopressor) 25 mg BID PO ;  Start 5/22/17 at 21:00


Morphine Sulfate (morphine) 4 mg Q4H  PRN IM PAIN LEVEL 6-10 Last administered 

on 5/22/17at 23:28; Admin Dose 4 MG;  Start 5/22/17 at 23:00


Ketorolac Tromethamine (Toradol) 30 mg Q6H  PRN IV PAIN Last administered on 5/ 23/17at 08:10; Admin Dose 30 MG;  Start 5/23/17 at 02:00;  Stop 5/26/17 at 01:59











YEVGENIY TSAI M.D. May 23, 2017 11:21

## 2017-05-23 NOTE — PN
Date/Time of Note


Date/Time of Note


DATE: 5/23/17 


TIME: 11:43





Assessment/Plan


VTE Prophylaxis


VTE Prophylaxis Intervention:  heparin





Lines/Catheters


IV Catheter Type (from [facility]):  Peripheral IV


Urinary Cath still in place:  No





Assessment/Plan


Chief Complaint/Hosp Course


ASSESSMENT AND PLAN: 52-year-old male status post knee surgery 01/2017 then all 

on 04/22/2017, who presents with left lower extremity deep venous thrombosis 

and extensive bilateral pulmonary embolism, now on heparin drip.  





1.  Blood clot- Again, patient has deep venous thrombosis and pulmonary 

embolism. It is unclear if patient's clot was from either stasis, specifically 

after his fall or after surgery versus possible hypercoagulable issue.  


   -  Continue heparin drip.  Follow up Hematology/Oncology, vasc surg, and 

pulm recommendations.  


   - f/u results of hypercoagulable workup.  


   - holding off on IVC filter placement for now.


    - Continue PT 


2.  Elevated troponin, non-ST elevation myocardial infarction versus secondary 

to #1.  The patient denies any chest pain presently troponins have been 

trending down. + FMHx of CAD.  


   - on heparin drip, continue for now.


   - f/u CV rec's


3.  History of chronic back pain and lower extremity numbness. 


   -  Continue current pain medications.  He is on morphine prn, and toradol 

prn.


   -  Have to be careful with opiate medications given his respiratory issues 

right now regarding the bilateral pulmonary embolism.


4.  History of chronic anxiety and depression, Ativan p.r.n.


5.  Smoking history.  Counseled on cessation. 


6.  Gastrointestinal prophylaxis.  H2 blocker.


7.  Deep venous thrombosis prophylaxis, heparin drip.


Problems:  





Subjective


24 Hr Interval Summary


Free Text/Dictation


Pt seen by Heme/Onc, pulm, and Vasc surgery team. Still some SOB.





Exam/Review of Systems


Vital Signs


Vitals





 Vital Signs








  Date Time  Temp Pulse Resp B/P Pulse Ox O2 Delivery O2 Flow Rate FiO2


 


5/23/17 11:09 97.7 65 18 108/55 98   


 


5/23/17 07:56      Simple Mask 8.0 














 Intake and Output   


 


 5/22/17 5/22/17 5/23/17





 15:00 23:00 07:00


 


Intake Total  960 ml 650 ml


 


Output Total  1300 ml 1100 ml


 


Balance  -340 ml -450 ml











Exam


GENERAL:  The patient lying in bed, slightly lethargic but alert, answering 

questions.


HEENT:  Pupils equal, round and extraocular movements intact.


NECK:  Supple, no thyromegaly.


LUNGS:  Slightly distant breath sounds.


CARDIOVASCULAR:  S1, S2 heard.  No rubs or gallops.


ABDOMEN:  Soft, nontender, nondistended.  Normal bowel sounds.  No rebound or 

guarding.


MUSCULOSKELETAL:  No lower extremity edema bilaterally.


NEUROLOGIC:  No focal deficits.





Results


Result Diagram:  


5/23/17 0630                                                                   

             5/22/17 0430





Results 24 hrs





Laboratory Tests








Test


  5/22/17


12:30 5/22/17


18:20 5/23/17


06:30


 


Activated Partial


Thromboplast Time 71.7  *H


  82.2  *H


  75.6  *H


 


 


White Blood Count   9.4  


 


Red Blood Count   4.49  L


 


Hemoglobin   12.9  L


 


Hematocrit   41.0  L


 


Mean Corpuscular Volume   91.3  


 


Mean Corpuscular Hemoglobin   28.7  L


 


Mean Corpuscular Hemoglobin


Concent 


  


  31.5  L


 


 


Red Cell Distribution Width   13.0  


 


Platelet Count   157  


 


Mean Platelet Volume   10.5  H


 


Neutrophils %   65.9  


 


Lymphocytes %   18.6  


 


Monocytes %   12.4  H


 


Eosinophils %   2.0  


 


Basophils %   0.6  


 


Nucleated Red Blood Cells %   0.0  


 


Neutrophils #   6.2  


 


Lymphocytes #   1.7  


 


Monocytes #   1.2  H


 


Eosinophils #   0.2  


 


Basophils #   0.1  


 


Nucleated Red Blood Cells #   0.0  


 


Troponin I   0.027  


 


Triglycerides Level   89  


 


Cholesterol Level   106  


 


LDL Cholesterol, Calculated   61  


 


HDL Cholesterol   27  L


 


Cholesterol/HDL Ratio   3.9  











Medications


Medications





 Current Medications


Buspirone HCl (Buspar) 10 mg BID PO  Last administered on 5/23/17at 08:09; 

Admin Dose 10 MG;  Start 5/21/17 at 21:00


Pregabalin (Lyrica) 75 mg BID PO  Last administered on 5/23/17at 08:09; Admin 

Dose 75 MG;  Start 5/21/17 at 21:00


Morphine Sulfate (morphine) 2 mg Q4H  PRN IV pain Last administered on 5/22/ 17at 20:51; Admin Dose 2 MG;  Start 5/21/17 at 18:00


Polyethylene Glycol (Miralax) 8.5 gm DAILY PO ;  Start 5/22/17 at 09:00


Famotidine (Pepcid Iv) 20 mg BID IV  Last administered on 5/23/17at 08:09; 

Admin Dose 20 MG;  Start 5/21/17 at 21:00


Aspirin (Halfprin) 81 mg DAILY PO  Last administered on 5/23/17at 08:09; Admin 

Dose 81 MG;  Start 5/22/17 at 09:00


Atorvastatin Calcium (Lipitor) 20 mg HS PO  Last administered on 5/22/17at 20:51

; Admin Dose 20 MG;  Start 5/21/17 at 21:00


Methocarbamol (Robaxin) 500 mg BID  PRN PO msc spasm Last administered on 5/23/ 17at 08:09; Admin Dose 500 MG;  Start 5/21/17 at 18:30


Ondansetron HCl (Zofran Inj) 4 mg Q6H  PRN IV NAUSEA AND/OR VOMITING;  Start 5/ 21/17 at 18:30


Amoxicillin (Amoxicillin) 500 mg Q8 PO  Last administered on 5/23/17at 06:15; 

Admin Dose 500 MG;  Start 5/21/17 at 22:00


Nitroglycerin (Nitroglycerin (Sl Tab) 0.4 Mg) 1 tab Q5M  PRN SL ANGINA Last 

administered on 5/23/17at 10:54; Admin Dose 1 TAB;  Start 5/21/17 at 22:00


Metoprolol Tartrate (Lopressor) 25 mg BID PO ;  Start 5/22/17 at 21:00


Morphine Sulfate (morphine) 4 mg Q4H  PRN IM PAIN LEVEL 6-10 Last administered 

on 5/22/17at 23:28; Admin Dose 4 MG;  Start 5/22/17 at 23:00


Ketorolac Tromethamine (Toradol) 30 mg Q6H  PRN IV PAIN Last administered on 5/ 23/17at 08:10; Admin Dose 30 MG;  Start 5/23/17 at 02:00;  Stop 5/26/17 at 01:59





Procedures


Procedures


2D ECHO:





Conclusions


1.   Normal left ventricular systolic function.  Normal left ventricular 


cavity size.  Normal left ventricular wall thickness.  Ejection fraction 


is visually estimated at 55 %.  Tissue Doppler/Mitral Doppler indices 


are consistent with impaired relaxation (Stage I diastolic dysfunction).


2.   Normal appearance and function of the mitral valve with trace 


physiologic regurgitation.


3.   Normal appearance of the tricuspid valve.  Estimated peak PA systolic 


pressure 39 mmHg.  There is trace tricuspid regurgitation.


4.   Normal pulmonic valve appearance.  There is trace pulmonic 


regurgitation.











MONY ORELLANA May 23, 2017 11:48

## 2017-05-24 VITALS — SYSTOLIC BLOOD PRESSURE: 125 MMHG | RESPIRATION RATE: 18 BRPM | DIASTOLIC BLOOD PRESSURE: 75 MMHG

## 2017-05-24 VITALS — HEART RATE: 67 BPM

## 2017-05-24 VITALS — SYSTOLIC BLOOD PRESSURE: 96 MMHG | DIASTOLIC BLOOD PRESSURE: 56 MMHG | RESPIRATION RATE: 19 BRPM

## 2017-05-24 VITALS — SYSTOLIC BLOOD PRESSURE: 102 MMHG | DIASTOLIC BLOOD PRESSURE: 58 MMHG | RESPIRATION RATE: 18 BRPM

## 2017-05-24 VITALS — DIASTOLIC BLOOD PRESSURE: 66 MMHG | RESPIRATION RATE: 18 BRPM | SYSTOLIC BLOOD PRESSURE: 115 MMHG

## 2017-05-24 VITALS — DIASTOLIC BLOOD PRESSURE: 69 MMHG | SYSTOLIC BLOOD PRESSURE: 117 MMHG | RESPIRATION RATE: 19 BRPM

## 2017-05-24 VITALS — HEART RATE: 58 BPM

## 2017-05-24 VITALS — DIASTOLIC BLOOD PRESSURE: 79 MMHG | RESPIRATION RATE: 18 BRPM | SYSTOLIC BLOOD PRESSURE: 148 MMHG

## 2017-05-24 VITALS — HEART RATE: 64 BPM

## 2017-05-24 VITALS — HEART RATE: 60 BPM

## 2017-05-24 VITALS — RESPIRATION RATE: 19 BRPM | SYSTOLIC BLOOD PRESSURE: 122 MMHG | DIASTOLIC BLOOD PRESSURE: 70 MMHG

## 2017-05-24 VITALS — HEART RATE: 53 BPM

## 2017-05-24 LAB
ADD SCAN DIFF: NO
ALBUMIN SERPL-MCNC: 3 G/DL (ref 3.3–4.9)
ANION GAP SERPL CALC-SCNC: 10 MMOL/L (ref 8–16)
APTT BLD: 66.5 SEC (ref 25–35)
BASOPHILS # BLD AUTO: 0.1 10^3/UL (ref 0–0.1)
BASOPHILS NFR BLD: 0.8 % (ref 0–2)
BUN SERPL-MCNC: 13 MG/DL (ref 7–20)
CALCIUM SERPL-MCNC: 8.8 MG/DL (ref 8.4–10.2)
CHLORIDE SERPL-SCNC: 106 MMOL/L (ref 97–110)
CO2 SERPL-SCNC: 27 MMOL/L (ref 21–31)
CREAT SERPL-MCNC: 1.02 MG/DL (ref 0.61–1.24)
EOSINOPHIL # BLD: 0.3 10^3/UL (ref 0–0.5)
EOSINOPHIL NFR BLD: 3.6 % (ref 0–7)
ERYTHROCYTE [DISTWIDTH] IN BLOOD BY AUTOMATED COUNT: 12.7 % (ref 11.5–14.5)
GLUCOSE SERPL-MCNC: 96 MG/DL (ref 70–220)
HCT VFR BLD CALC: 41 % (ref 42–52)
HGB BLD-MCNC: 12.8 G/DL (ref 14–18)
INR PPP: 1.01
LYMPHOCYTES # BLD AUTO: 0.9 10^3/UL (ref 0.8–2.9)
LYMPHOCYTES NFR BLD AUTO: 12.2 % (ref 15–51)
MCH RBC QN AUTO: 28.4 PG (ref 29–33)
MCHC RBC AUTO-ENTMCNC: 31.2 G/DL (ref 32–37)
MCV RBC AUTO: 90.9 FL (ref 82–101)
MONOCYTES # BLD: 0.8 10^3/UL (ref 0.3–0.9)
MONOCYTES NFR BLD: 11.1 % (ref 0–11)
NEUTROPHILS # BLD: 5.2 10^3/UL (ref 1.6–7.5)
NEUTROPHILS NFR BLD AUTO: 71.8 % (ref 39–77)
NRBC # BLD MANUAL: 0 10^3/UL (ref 0–0)
NRBC BLD QL: 0 /100WBC (ref 0–0)
PHOSPHATE SERPL-MCNC: 4 MG/DL (ref 2.5–4.9)
PLATELET # BLD: 178 10^3/UL (ref 140–415)
PMV BLD AUTO: 10.7 FL (ref 7.4–10.4)
POTASSIUM SERPL-SCNC: 4.9 MMOL/L (ref 3.5–5.1)
PROTHROMBIN TIME: 13.3 SEC (ref 12.2–14.2)
PT RATIO: 1
RBC # BLD AUTO: 4.51 10^6/UL (ref 4.7–6.1)
SODIUM SERPL-SCNC: 138 MMOL/L (ref 135–144)
WBC # BLD AUTO: 7.3 10^3/UL (ref 4.8–10.8)

## 2017-05-24 RX ADMIN — HEPARIN SODIUM AND DEXTROSE SCH MLS/HR: 10000; 5 INJECTION INTRAVENOUS at 09:07

## 2017-05-24 RX ADMIN — MORPHINE SULFATE PRN MG: 2 INJECTION, SOLUTION INTRAMUSCULAR; INTRAVENOUS at 17:12

## 2017-05-24 RX ADMIN — KETOROLAC TROMETHAMINE PRN MG: 30 INJECTION, SOLUTION INTRAMUSCULAR at 23:22

## 2017-05-24 RX ADMIN — HEPARIN SODIUM AND DEXTROSE SCH MLS/HR: 10000; 5 INJECTION INTRAVENOUS at 17:31

## 2017-05-24 RX ADMIN — POLYETHYLENE GLYCOL 3350 SCH GM: 17 POWDER, FOR SOLUTION ORAL at 08:57

## 2017-05-24 RX ADMIN — MORPHINE SULFATE PRN MG: 2 INJECTION, SOLUTION INTRAMUSCULAR; INTRAVENOUS at 08:54

## 2017-05-24 RX ADMIN — BUSPIRONE HYDROCHLORIDE SCH MG: 10 TABLET ORAL at 21:33

## 2017-05-24 RX ADMIN — BUSPIRONE HYDROCHLORIDE SCH MG: 10 TABLET ORAL at 08:56

## 2017-05-24 RX ADMIN — HEPARIN SODIUM AND DEXTROSE SCH MLS/HR: 10000; 5 INJECTION INTRAVENOUS at 10:23

## 2017-05-24 RX ADMIN — PREGABALIN SCH MG: 75 CAPSULE ORAL at 21:32

## 2017-05-24 RX ADMIN — KETOROLAC TROMETHAMINE PRN MG: 30 INJECTION, SOLUTION INTRAMUSCULAR at 11:48

## 2017-05-24 RX ADMIN — KETOROLAC TROMETHAMINE PRN MG: 30 INJECTION, SOLUTION INTRAMUSCULAR at 02:51

## 2017-05-24 RX ADMIN — HEPARIN SODIUM AND DEXTROSE SCH MLS/HR: 10000; 5 INJECTION INTRAVENOUS at 02:41

## 2017-05-24 RX ADMIN — FAMOTIDINE SCH MG: 20 TABLET ORAL at 21:33

## 2017-05-24 RX ADMIN — METOPROLOL TARTRATE SCH MG: 25 TABLET, FILM COATED ORAL at 21:33

## 2017-05-24 RX ADMIN — ATORVASTATIN CALCIUM SCH MG: 20 TABLET, FILM COATED ORAL at 21:32

## 2017-05-24 RX ADMIN — FAMOTIDINE SCH MG: 20 TABLET ORAL at 08:56

## 2017-05-24 RX ADMIN — PREGABALIN SCH MG: 75 CAPSULE ORAL at 08:56

## 2017-05-24 RX ADMIN — HEPARIN SODIUM AND DEXTROSE SCH MLS/HR: 10000; 5 INJECTION INTRAVENOUS at 17:09

## 2017-05-24 RX ADMIN — ASPIRIN SCH MG: 81 TABLET, COATED ORAL at 08:56

## 2017-05-24 RX ADMIN — METOPROLOL TARTRATE SCH MG: 25 TABLET, FILM COATED ORAL at 08:56

## 2017-05-24 NOTE — CONS
Date/Time of Note


Date/Time of Note


DATE: 5/24/17 


TIME: 14:21





Assessment/Plan


Assessment/Plan


Chief Complaint/Hosp Course


IMPRESSION:


1.  Positive troponin in the setting of bilateral pulmonary emboli.-

downtrending. Likely due to PE type 2 infarct. NL EF by echo this admit


2.  Chest pain in the setting of bilateral pulmonary emboli.


3.  Dyspnea on exertion with known bilateral pulmonary emboli.


4.  Lower extremity edema with known lower extremity deep venous thrombosis.


5.  Anemia, mild.


6.  Ongoing tobacco usage.





Recc:


-Tele


-Continue asa/statin


-IV heparin with transition to po anticoagulation


-trend cardiac enzymes


-Continue BB


-continue abx's and f/u cx data


Problems:  





Consultation Date/Type/Reason


Admit Date/Time


May 21, 2017 at 14:45


Initial Consult Date


5/22/17


Type of Consultation:  Cardiology


Reason for Consultation


positive troponin


Referring Provider:  JO LEE





Exam/Review of Systems


Vital Signs


Vitals





 Vital Signs








  Date Time  Temp Pulse Resp B/P Pulse Ox O2 Delivery O2 Flow Rate FiO2


 


5/24/17 13:56     98 Nasal Cannula 4.0 


 


5/24/17 12:16  58      


 


5/24/17 11:22 98.3  19 122/70    














 Intake and Output   


 


 5/23/17 5/23/17 5/24/17





 14:59 22:59 06:59


 


Intake Total  1070 ml 650 ml


 


Output Total  900 ml 


 


Balance  170 ml 650 ml











Exam


Review of Systems:


CONSTITUTIONAL:  No fevers, chills.


PULMONARY:  No sob


CARDIOVASCULAR: No chest pain/palpitations


GASTROINTESTINAL:  No nausea/vomiting.


GENITOURINARY:  No hematuria/dysuria.


MUSCULOSKELETAL:  No myagias/arthalgias.


PSYCHIATRIC:  The patient denies depression.


NEUROLOGIC:   No weakness


Constitutional:  alert


Psych:  no complaints


Head:  normocephalic


ENMT:  mucosa pink and moist


Neck:  jvd (9 cm water), supple


Respiratory:  diminished breath sounds


Cardiovascular:  regular rate and rhythm


Gastrointestinal:  soft


Musculoskeletal:  muscle tone (normal)


Extremities:  edema (none)


Neurological:  other (No focal deficits)





Results


Result Diagram:  


5/24/17 0720                                                                   

             5/24/17 0720





Results 24 hrs





Laboratory Tests








Test


  5/24/17


07:20


 


White Blood Count 7.3  #


 


Red Blood Count 4.51  L


 


Hemoglobin 12.8  L


 


Hematocrit 41.0  L


 


Mean Corpuscular Volume 90.9  


 


Mean Corpuscular Hemoglobin 28.4  L


 


Mean Corpuscular Hemoglobin


Concent 31.2  L


 


 


Red Cell Distribution Width 12.7  


 


Platelet Count 178  


 


Mean Platelet Volume 10.7  H


 


Neutrophils % 71.8  


 


Lymphocytes % 12.2  L


 


Monocytes % 11.1  H


 


Eosinophils % 3.6  


 


Basophils % 0.8  


 


Nucleated Red Blood Cells % 0.0  


 


Neutrophils # 5.2  


 


Lymphocytes # 0.9  


 


Monocytes # 0.8  


 


Eosinophils # 0.3  


 


Basophils # 0.1  


 


Nucleated Red Blood Cells # 0.0  


 


Prothrombin Time 13.3  


 


Prothrombin Time Ratio 1.0  


 


INR International Normalized


Ratio 1.01  


 


 


Activated Partial


Thromboplast Time 66.5  H


 


 


Sodium Level 138  


 


Potassium Level 4.9  


 


Chloride Level 106  


 


Carbon Dioxide Level 27  


 


Anion Gap 10  


 


Blood Urea Nitrogen 13  


 


Creatinine 1.02  


 


Glucose Level 96  


 


Calcium Level 8.8  


 


Phosphorus Level 4.0  


 


Albumin 3.0  L











Medications


Medications





 Current Medications


Buspirone HCl (Buspar) 10 mg BID PO  Last administered on 5/24/17at 08:56; 

Admin Dose 10 MG;  Start 5/21/17 at 21:00


Pregabalin (Lyrica) 75 mg BID PO  Last administered on 5/24/17at 08:56; Admin 

Dose 75 MG;  Start 5/21/17 at 21:00


Morphine Sulfate (morphine) 2 mg Q4H  PRN IV pain Last administered on 5/24/ 17at 08:54; Admin Dose 2 MG;  Start 5/21/17 at 18:00


Polyethylene Glycol (Miralax) 8.5 gm DAILY PO  Last administered on 5/24/17at 08

:57; Admin Dose 8.5 GM;  Start 5/22/17 at 09:00


Aspirin (Halfprin) 81 mg DAILY PO  Last administered on 5/24/17at 08:56; Admin 

Dose 81 MG;  Start 5/22/17 at 09:00


Atorvastatin Calcium (Lipitor) 20 mg HS PO  Last administered on 5/23/17at 20:21

; Admin Dose 20 MG;  Start 5/21/17 at 21:00


Methocarbamol (Robaxin) 500 mg BID  PRN PO msc spasm Last administered on 5/23/ 17at 08:09; Admin Dose 500 MG;  Start 5/21/17 at 18:30


Ondansetron HCl (Zofran Inj) 4 mg Q6H  PRN IV NAUSEA AND/OR VOMITING;  Start 5/ 21/17 at 18:30


Amoxicillin (Amoxicillin) 500 mg Q8 PO  Last administered on 5/24/17at 13:55; 

Admin Dose 500 MG;  Start 5/21/17 at 22:00


Metoprolol Tartrate (Lopressor) 25 mg BID PO  Last administered on 5/24/17at 08:

56; Admin Dose 25 MG;  Start 5/22/17 at 21:00


Morphine Sulfate (morphine) 4 mg Q4H  PRN IM PAIN LEVEL 6-10 Last administered 

on 5/23/17at 22:17; Admin Dose 4 MG;  Start 5/22/17 at 23:00


Ketorolac Tromethamine (Toradol) 30 mg Q6H  PRN IV PAIN Last administered on 5/ 24/17at 11:48; Admin Dose 30 MG;  Start 5/23/17 at 02:00;  Stop 5/26/17 at 01:59


Famotidine (Pepcid) 20 mg BID PO  Last administered on 5/24/17at 08:56; Admin 

Dose 20 MG;  Start 5/23/17 at 21:00


Menthol/Methyl Salicylate (Srini Pierre) 1 applic TID TOP ;  Start 5/24/17 at 21:00


Nitroglycerin (Nitroglycerin (Sl Tab) 0.4 Mg) 1 tab Q20M  PRN SL ANGINA;  Start 

5/24/17 at 16:10;  Status CARL DILLON May 24, 2017 14:30

## 2017-05-24 NOTE — CONS
Date/Time of Note


Date/Time of Note


DATE: 5/24/17 


TIME: 15:47





Assessment/Plan


Assessment/Plan


Chief Complaint/Hosp Course


53 yo male s/p knee surgery in Jan 2017 and then fall on April 22, who now 

presents with LLE DVT and extensive bilateral pulmonary emboli. Although it is 

likely that the VTE is provoked from the recent fall and surgery, it is 

reasonable to perform a hypercoagulable workup given the extensive nature of 

the VTE. 








-f/u Factor V Leiden Mutation, Prothrombin Gene Mutation, Protein C levels, 

Protein S levels. 


-although patient is on a heparin drip will check antithrombin III levels 

although this might not be reliable while patient is on a heparin drip


-will need to rule out antiphospholipid syndrome. check for Lupus anticoagulant

, B2 GP and Anticardiolipin antibodies


-cont heparin gtt for now. would be ok with starting Eliquis 5mg po BID tomorrow


-appreciate vascular surgery and pulmonary recs. given that patient has not 

failed anticoagulation and he stable from a cardiac/ pulmonary standpoint , we 

can hold on an IVC filter at this time. When he gets surgery in the future we 

can place a temporary filter at that time


Problems:  





Consultation Date/Type/Reason


Admit Date/Time


May 21, 2017 at 14:45


Initial Consult Date


5/22/17


Type of Consultation:  Hematology


Reason for Consultation


LLE DVT and pulmonary embolism


Referring Provider:  JO LEE





24 HR Interval Summary


Free Text/Dictation


pt's chest pain has improved. still with musculoskeletal soreness





Exam/Review of Systems


Vital Signs


Vitals





 Vital Signs








  Date Time  Temp Pulse Resp B/P Pulse Ox O2 Delivery O2 Flow Rate FiO2


 


5/24/17 14:44       4.0 36


 


5/24/17 13:56     98 Nasal Cannula  


 


5/24/17 12:16  58      


 


5/24/17 11:22 98.3  19 122/70    














 Intake and Output   


 


 5/23/17 5/23/17 5/24/17





 15:00 23:00 07:00


 


Intake Total  1070 ml 650 ml


 


Output Total  900 ml 


 


Balance  170 ml 650 ml











Exam


Constitutional:  alert, oriented


Psych:  no complaints


Head:  atraumatic, normocephalic


Eyes:  nl conjunctiva


ENMT:  nl external ears & nose


Neck:  non-tender, supple


Respiratory:  clear to auscultation, normal air movement


Cardiovascular:  regular rate and rhythm


Gastrointestinal:  soft


Extremities:  normal pulses





Results


Result Diagram:  


5/24/17 0720                                                                   

             5/24/17 0720





Results 24 hrs





Laboratory Tests








Test


  5/24/17


07:20


 


White Blood Count 7.3  #


 


Red Blood Count 4.51  L


 


Hemoglobin 12.8  L


 


Hematocrit 41.0  L


 


Mean Corpuscular Volume 90.9  


 


Mean Corpuscular Hemoglobin 28.4  L


 


Mean Corpuscular Hemoglobin


Concent 31.2  L


 


 


Red Cell Distribution Width 12.7  


 


Platelet Count 178  


 


Mean Platelet Volume 10.7  H


 


Neutrophils % 71.8  


 


Lymphocytes % 12.2  L


 


Monocytes % 11.1  H


 


Eosinophils % 3.6  


 


Basophils % 0.8  


 


Nucleated Red Blood Cells % 0.0  


 


Neutrophils # 5.2  


 


Lymphocytes # 0.9  


 


Monocytes # 0.8  


 


Eosinophils # 0.3  


 


Basophils # 0.1  


 


Nucleated Red Blood Cells # 0.0  


 


Prothrombin Time 13.3  


 


Prothrombin Time Ratio 1.0  


 


INR International Normalized


Ratio 1.01  


 


 


Activated Partial


Thromboplast Time 66.5  H


 


 


Sodium Level 138  


 


Potassium Level 4.9  


 


Chloride Level 106  


 


Carbon Dioxide Level 27  


 


Anion Gap 10  


 


Blood Urea Nitrogen 13  


 


Creatinine 1.02  


 


Glucose Level 96  


 


Calcium Level 8.8  


 


Phosphorus Level 4.0  


 


Albumin 3.0  L











Medications


Medications





 Current Medications


Buspirone HCl (Buspar) 10 mg BID PO  Last administered on 5/24/17at 08:56; 

Admin Dose 10 MG;  Start 5/21/17 at 21:00


Pregabalin (Lyrica) 75 mg BID PO  Last administered on 5/24/17at 08:56; Admin 

Dose 75 MG;  Start 5/21/17 at 21:00


Morphine Sulfate (morphine) 2 mg Q4H  PRN IV pain Last administered on 5/24/ 17at 08:54; Admin Dose 2 MG;  Start 5/21/17 at 18:00


Polyethylene Glycol (Miralax) 8.5 gm DAILY PO  Last administered on 5/24/17at 08

:57; Admin Dose 8.5 GM;  Start 5/22/17 at 09:00


Aspirin (Halfprin) 81 mg DAILY PO  Last administered on 5/24/17at 08:56; Admin 

Dose 81 MG;  Start 5/22/17 at 09:00


Atorvastatin Calcium (Lipitor) 20 mg HS PO  Last administered on 5/23/17at 20:21

; Admin Dose 20 MG;  Start 5/21/17 at 21:00


Methocarbamol (Robaxin) 500 mg BID  PRN PO msc spasm Last administered on 5/23/ 17at 08:09; Admin Dose 500 MG;  Start 5/21/17 at 18:30


Ondansetron HCl (Zofran Inj) 4 mg Q6H  PRN IV NAUSEA AND/OR VOMITING;  Start 5/ 21/17 at 18:30


Amoxicillin (Amoxicillin) 500 mg Q8 PO  Last administered on 5/24/17at 13:55; 

Admin Dose 500 MG;  Start 5/21/17 at 22:00


Metoprolol Tartrate (Lopressor) 25 mg BID PO  Last administered on 5/24/17at 08:

56; Admin Dose 25 MG;  Start 5/22/17 at 21:00


Morphine Sulfate (morphine) 4 mg Q4H  PRN IM PAIN LEVEL 6-10 Last administered 

on 5/23/17at 22:17; Admin Dose 4 MG;  Start 5/22/17 at 23:00


Ketorolac Tromethamine (Toradol) 30 mg Q6H  PRN IV PAIN Last administered on 5/ 24/17at 11:48; Admin Dose 30 MG;  Start 5/23/17 at 02:00;  Stop 5/26/17 at 01:59


Famotidine (Pepcid) 20 mg BID PO  Last administered on 5/24/17at 08:56; Admin 

Dose 20 MG;  Start 5/23/17 at 21:00


Menthol/Methyl Salicylate (Srini Pierre) 1 applic TID TOP ;  Start 5/24/17 at 21:00


Nitroglycerin (Nitroglycerin (Sl Tab) 0.4 Mg) 1 tab Q5M  PRN SL ANGINA;  Start 5 /24/17 at 16:10











YEVGENIY TSAI M.D. May 24, 2017 15:51

## 2017-05-24 NOTE — PN
Date/Time of Note


Date/Time of Note


DATE: 5/24/17 


TIME: 14:31





Assessment/Plan


VTE Prophylaxis


VTE Prophylaxis Intervention:  heparin





Lines/Catheters


IV Catheter Type (from [facility]):  Peripheral IV


Urinary Cath still in place:  No





Assessment/Plan


Chief Complaint/Hosp Course


ASSESSMENT AND PLAN: 52-year-old male status post knee surgery 01/2017 then all 

on 04/22/2017, who presents with left lower extremity deep venous thrombosis 

and extensive bilateral pulmonary embolism, now on heparin drip.  





1.  Blood clot- Again, patient has deep venous thrombosis and pulmonary 

embolism. It is unclear if patient's clot was from either stasis, specifically 

after his fall or after surgery versus possible hypercoagulable issue.  


   -  Continue heparin drip.  Follow up Hematology/Oncology, vasc surg, and 

pulm recommendations.  


   - f/u results of hypercoagulable workup (pending)


   - will discuss with consultants about IVC filter placement


    - Continue PT 


2.  Elevated troponin, non-ST elevation myocardial infarction versus secondary 

to #1.  The patient denies any chest pain presently troponins have been 

trending down. + FMHx of CAD.  


   - on heparin drip, continue for now.


   - f/u CV rec's


3.  History of chronic back pain and lower extremity numbness. 


   -  Continue current pain medications.  He is on morphine prn, and toradol 

prn.


   -  Have to be careful with opiate medications given his respiratory issues 

right now regarding the bilateral pulmonary embolism.


4.  History of chronic anxiety and depression, Ativan p.r.n.


5.  Smoking history.  Counseled on cessation. 


6.  Gastrointestinal prophylaxis.  H2 blocker.


7.  Deep venous thrombosis prophylaxis, heparin drip.


Problems:  





Subjective


24 Hr Interval Summary


Free Text/Dictation


Pt has slightly less cp. Has lots of questions about cause of clot, and future 

surgeries, and IVC filter, questions answered.





Exam/Review of Systems


Vital Signs


Vitals





 Vital Signs








  Date Time  Temp Pulse Resp B/P Pulse Ox O2 Delivery O2 Flow Rate FiO2


 


5/24/17 13:56     98 Nasal Cannula 4.0 


 


5/24/17 12:16  58      


 


5/24/17 11:22 98.3  19 122/70    














 Intake and Output   


 


 5/23/17 5/23/17 5/24/17





 15:00 23:00 07:00


 


Intake Total  1070 ml 650 ml


 


Output Total  900 ml 


 


Balance  170 ml 650 ml











Exam


GENERAL:  The patient lying in bed, slightly lethargic but alert, answering 

questions.


HEENT:  Pupils equal, round and extraocular movements intact.


NECK:  Supple, no thyromegaly.


LUNGS:  Slightly distant breath sounds.


CARDIOVASCULAR:  S1, S2 heard.  No rubs or gallops.


ABDOMEN:  Soft, nontender, nondistended.  Normal bowel sounds.  No rebound or 

guarding.


MUSCULOSKELETAL:  No lower extremity edema bilaterally.


NEUROLOGIC:  No focal deficits.





Results


Result Diagram:  


5/24/17 0720                                                                   

             5/24/17 0720





Results 24 hrs





Laboratory Tests








Test


  5/24/17


07:20


 


White Blood Count 7.3  #


 


Red Blood Count 4.51  L


 


Hemoglobin 12.8  L


 


Hematocrit 41.0  L


 


Mean Corpuscular Volume 90.9  


 


Mean Corpuscular Hemoglobin 28.4  L


 


Mean Corpuscular Hemoglobin


Concent 31.2  L


 


 


Red Cell Distribution Width 12.7  


 


Platelet Count 178  


 


Mean Platelet Volume 10.7  H


 


Neutrophils % 71.8  


 


Lymphocytes % 12.2  L


 


Monocytes % 11.1  H


 


Eosinophils % 3.6  


 


Basophils % 0.8  


 


Nucleated Red Blood Cells % 0.0  


 


Neutrophils # 5.2  


 


Lymphocytes # 0.9  


 


Monocytes # 0.8  


 


Eosinophils # 0.3  


 


Basophils # 0.1  


 


Nucleated Red Blood Cells # 0.0  


 


Prothrombin Time 13.3  


 


Prothrombin Time Ratio 1.0  


 


INR International Normalized


Ratio 1.01  


 


 


Activated Partial


Thromboplast Time 66.5  H


 


 


Sodium Level 138  


 


Potassium Level 4.9  


 


Chloride Level 106  


 


Carbon Dioxide Level 27  


 


Anion Gap 10  


 


Blood Urea Nitrogen 13  


 


Creatinine 1.02  


 


Glucose Level 96  


 


Calcium Level 8.8  


 


Phosphorus Level 4.0  


 


Albumin 3.0  L











Medications


Medications





 Current Medications


Buspirone HCl (Buspar) 10 mg BID PO  Last administered on 5/24/17at 08:56; 

Admin Dose 10 MG;  Start 5/21/17 at 21:00


Pregabalin (Lyrica) 75 mg BID PO  Last administered on 5/24/17at 08:56; Admin 

Dose 75 MG;  Start 5/21/17 at 21:00


Morphine Sulfate (morphine) 2 mg Q4H  PRN IV pain Last administered on 5/24/ 17at 08:54; Admin Dose 2 MG;  Start 5/21/17 at 18:00


Polyethylene Glycol (Miralax) 8.5 gm DAILY PO  Last administered on 5/24/17at 08

:57; Admin Dose 8.5 GM;  Start 5/22/17 at 09:00


Aspirin (Halfprin) 81 mg DAILY PO  Last administered on 5/24/17at 08:56; Admin 

Dose 81 MG;  Start 5/22/17 at 09:00


Atorvastatin Calcium (Lipitor) 20 mg HS PO  Last administered on 5/23/17at 20:21

; Admin Dose 20 MG;  Start 5/21/17 at 21:00


Methocarbamol (Robaxin) 500 mg BID  PRN PO msc spasm Last administered on 5/23/ 17at 08:09; Admin Dose 500 MG;  Start 5/21/17 at 18:30


Ondansetron HCl (Zofran Inj) 4 mg Q6H  PRN IV NAUSEA AND/OR VOMITING;  Start 5/ 21/17 at 18:30


Amoxicillin (Amoxicillin) 500 mg Q8 PO  Last administered on 5/24/17at 13:55; 

Admin Dose 500 MG;  Start 5/21/17 at 22:00


Metoprolol Tartrate (Lopressor) 25 mg BID PO  Last administered on 5/24/17at 08:

56; Admin Dose 25 MG;  Start 5/22/17 at 21:00


Morphine Sulfate (morphine) 4 mg Q4H  PRN IM PAIN LEVEL 6-10 Last administered 

on 5/23/17at 22:17; Admin Dose 4 MG;  Start 5/22/17 at 23:00


Ketorolac Tromethamine (Toradol) 30 mg Q6H  PRN IV PAIN Last administered on 5/ 24/17at 11:48; Admin Dose 30 MG;  Start 5/23/17 at 02:00;  Stop 5/26/17 at 01:59


Famotidine (Pepcid) 20 mg BID PO  Last administered on 5/24/17at 08:56; Admin 

Dose 20 MG;  Start 5/23/17 at 21:00


Menthol/Methyl Salicylate (Srini Pierre) 1 applic TID TOP ;  Start 5/24/17 at 21:00


Nitroglycerin (Nitroglycerin (Sl Tab) 0.4 Mg) 1 tab Q20M  PRN SL ANGINA;  Start 

5/24/17 at 16:10;  Status MONY CARO May 24, 2017 14:34

## 2017-05-24 NOTE — PN
Date/Time of Note


Date/Time of Note


DATE: 5/24/17 


TIME: 18:45





Assessment/Plan


Lines/Catheters


IV Catheter Type (from [facility]):  Peripheral IV


Bennett in Place (from [facility]):  No





Assessment/Plan


Chief Complaint/Hosp Course


-Left lower extremity deep vein thrombosis of the femoral vein and bilateral 

pulmonary emboli:   It seems the patient has developed a provoked deep vein 

thrombosis of his left lower extremity with findings of pulmonary embolism.  

Unfortunately, as the patient's clot seems to be in the distal femoral vein, no 

further intervention from an endovascular approach will be needed.  Further, 

the patient would not be able to tolerate laying in the prone position for an 

intervention.  It is unclear if the patient had left lower extremity longer 

than 14 days as there will be no benefit for any endovascular intervention to 

prevent post-thrombotic syndrome in the near future.


-Recommend for the patient continue anticoagulation


-Will follow hypercoagulable workup and appreciate hematology evaluations.


-No indication for an IVC filter placement at this time, However if our 

orthopedic colleagues plan to do any further interventions in the near future 

will plan for IVC filter placement prior to surgery


Optimize vascular status (BP meds, diet, nutrition, exercise, sugar control, 

antiplatelets, weight loss and anticoagulation). 


-Discussed findings, plan and management with the patient and his partner at 

the bedside.  


-Discussed smoking cessation with the patient.


-Thank you for allowing us to participate in the care of your patient.  Please 

call with any questions.


Problems:  





Subjective


24 Hr Interval Summary


no new vascular events overnight





Exam/Review of Systems


Vital Signs


Vitals





 Vital Signs








  Date Time  Temp Pulse Resp B/P Pulse Ox O2 Delivery O2 Flow Rate FiO2


 


5/24/17 16:14  53      


 


5/24/17 16:11 98.2  19 96/56 98   


 


5/24/17 14:44       4.0 36


 


5/24/17 13:56      Nasal Cannula  














 Intake and Output   


 


 5/23/17 5/23/17 5/24/17





 15:00 23:00 07:00


 


Intake Total  1070 ml 650 ml


 


Output Total  900 ml 


 


Balance  170 ml 650 ml











Exam


Free Text/Dictation


GENERAL:  Alert and oriented x3, some difficulty with his breathing.


PULMONARY:  Coarse breath sounds bilaterally with some crackles at the bases.


CARDIOVASCULAR:  S1, S2 present. 


ABDOMEN:  Soft, nontender, nondistended.  Bowel sounds positive.  Truncal 

obesity.  


EXTREMITIES:


Right lower extremity palpable femoral pulse, palpable pedal pulse.  Motor, 

sensory intact.  Cap refill 2 to 3 seconds.


Left lower extremity palpable femoral pulse, palpable pedal pulse.  Motor, 

sensory intact.  Capillary refill 2 to 3 seconds.  Edema 2+ and tender upon 

palpation of the calf





Results


Result Diagram:  


5/24/17 0720                                                                   

             5/24/17 0720














GUILLERMO ALSTON MD May 24, 2017 18:50

## 2017-05-24 NOTE — CONS
Date/Time of Note


Date/Time of Note


DATE: 5/24/17 


TIME: 10:43





Assessment/Plan


Assessment/Plan


Additional Assessment/Plan


Assessment recommendations;





1.  Patient admitted with bilateral PE as well as left lower extremity DVT, 

currently on IV heparin.


2.  Recent back surgery.


3.  History of hypertension and hyperlipidemia.


4.  Chronic pain.





Continue current treatment.  Coumadin can be started.  However patient also is 

a candidate for either Xarelto or apixaban administration.  If either of these 

medications are a consideration I would recommend IV heparin at least for 5 

days before switching the patient to oral anticoagulation with either of these 

2 agents.





Consultation Date/Type/Reason


Admit Date/Time


May 21, 2017 at 14:45


Initial Consult Date


5/22/17


Type of Consultation:  Pulmonary


Referring Provider:  JO LEE





24 HR Interval Summary


Free Text/Dictation


Patient is reporting decreased chest pain on left side.  Complains of very mild 

shortness of breath.  Complains of left leg pain.  Complains of chronic back 

pain.





General exam; middle-aged male, currently in no distress.





Exam/Review of Systems


Vital Signs


Vitals





 Vital Signs








  Date Time  Temp Pulse Resp B/P Pulse Ox O2 Delivery O2 Flow Rate FiO2


 


5/24/17 08:33  60      


 


5/24/17 06:54 98.2  19 117/69 100   


 


5/23/17 20:21      Simple Mask 8.0 














 Intake and Output   


 


 5/23/17 5/23/17 5/24/17





 15:00 23:00 07:00


 


Intake Total  1070 ml 650 ml


 


Output Total  900 ml 


 


Balance  170 ml 650 ml











Exam


HEENT exam; supple neck, no JVD.  No lymphadenopathy.  Midline trachea.  No 

thyromegaly.  Pharynx is clear.





Chest examined; clear to auscultation.  S1-S2 audible, no murmurs.  Regular 

rhythm.





Abdomen exam is; soft, nondistended.  No organomegaly.  Bowel sounds audible.  





Extremity exam; there is mild left calf tenderness.  No peripheral edema.  

Pulses 1+ bilaterally.





CNS examination; no focal deficit.





Results


Result Diagram:  


5/24/17 0720 5/24/17 0720





Results 24 hrs





Laboratory Tests








Test


  5/24/17


07:20


 


White Blood Count 7.3  #


 


Red Blood Count 4.51  L


 


Hemoglobin 12.8  L


 


Hematocrit 41.0  L


 


Mean Corpuscular Volume 90.9  


 


Mean Corpuscular Hemoglobin 28.4  L


 


Mean Corpuscular Hemoglobin


Concent 31.2  L


 


 


Red Cell Distribution Width 12.7  


 


Platelet Count 178  


 


Mean Platelet Volume 10.7  H


 


Neutrophils % 71.8  


 


Lymphocytes % 12.2  L


 


Monocytes % 11.1  H


 


Eosinophils % 3.6  


 


Basophils % 0.8  


 


Nucleated Red Blood Cells % 0.0  


 


Neutrophils # 5.2  


 


Lymphocytes # 0.9  


 


Monocytes # 0.8  


 


Eosinophils # 0.3  


 


Basophils # 0.1  


 


Nucleated Red Blood Cells # 0.0  


 


Prothrombin Time 13.3  


 


Prothrombin Time Ratio 1.0  


 


INR International Normalized


Ratio 1.01  


 


 


Activated Partial


Thromboplast Time 66.5  H


 


 


Sodium Level 138  


 


Potassium Level 4.9  


 


Chloride Level 106  


 


Carbon Dioxide Level 27  


 


Anion Gap 10  


 


Blood Urea Nitrogen 13  


 


Creatinine 1.02  


 


Glucose Level 96  


 


Calcium Level 8.8  


 


Phosphorus Level 4.0  


 


Albumin 3.0  L











Medications


Medications





 Current Medications


Buspirone HCl (Buspar) 10 mg BID PO  Last administered on 5/24/17at 08:56; 

Admin Dose 10 MG;  Start 5/21/17 at 21:00


Pregabalin (Lyrica) 75 mg BID PO  Last administered on 5/24/17at 08:56; Admin 

Dose 75 MG;  Start 5/21/17 at 21:00


Morphine Sulfate (morphine) 2 mg Q4H  PRN IV pain Last administered on 5/24/ 17at 08:54; Admin Dose 2 MG;  Start 5/21/17 at 18:00


Polyethylene Glycol (Miralax) 8.5 gm DAILY PO  Last administered on 5/24/17at 08

:57; Admin Dose 8.5 GM;  Start 5/22/17 at 09:00


Aspirin (Halfprin) 81 mg DAILY PO  Last administered on 5/24/17at 08:56; Admin 

Dose 81 MG;  Start 5/22/17 at 09:00


Atorvastatin Calcium (Lipitor) 20 mg HS PO  Last administered on 5/23/17at 20:21

; Admin Dose 20 MG;  Start 5/21/17 at 21:00


Methocarbamol (Robaxin) 500 mg BID  PRN PO msc spasm Last administered on 5/23/ 17at 08:09; Admin Dose 500 MG;  Start 5/21/17 at 18:30


Ondansetron HCl (Zofran Inj) 4 mg Q6H  PRN IV NAUSEA AND/OR VOMITING;  Start 5/ 21/17 at 18:30


Amoxicillin (Amoxicillin) 500 mg Q8 PO  Last administered on 5/24/17at 05:51; 

Admin Dose 500 MG;  Start 5/21/17 at 22:00


Nitroglycerin (Nitroglycerin (Sl Tab) 0.4 Mg) 1 tab Q5M  PRN SL ANGINA Last 

administered on 5/23/17at 17:04; Admin Dose 1 TAB;  Start 5/21/17 at 22:00


Metoprolol Tartrate (Lopressor) 25 mg BID PO  Last administered on 5/24/17at 08:

56; Admin Dose 25 MG;  Start 5/22/17 at 21:00


Morphine Sulfate (morphine) 4 mg Q4H  PRN IM PAIN LEVEL 6-10 Last administered 

on 5/23/17at 22:17; Admin Dose 4 MG;  Start 5/22/17 at 23:00


Ketorolac Tromethamine (Toradol) 30 mg Q6H  PRN IV PAIN Last administered on 5/ 24/17at 02:51; Admin Dose 30 MG;  Start 5/23/17 at 02:00;  Stop 5/26/17 at 01:59


Famotidine (Pepcid) 20 mg BID PO  Last administered on 5/24/17at 08:56; Admin 

Dose 20 MG;  Start 5/23/17 at 21:00











SHIRIN LUCIO May 24, 2017 10:45

## 2017-05-25 VITALS — SYSTOLIC BLOOD PRESSURE: 141 MMHG | RESPIRATION RATE: 16 BRPM | DIASTOLIC BLOOD PRESSURE: 78 MMHG

## 2017-05-25 VITALS — RESPIRATION RATE: 20 BRPM | SYSTOLIC BLOOD PRESSURE: 103 MMHG | DIASTOLIC BLOOD PRESSURE: 59 MMHG

## 2017-05-25 VITALS — HEART RATE: 48 BPM

## 2017-05-25 VITALS — HEART RATE: 50 BPM

## 2017-05-25 VITALS — HEART RATE: 45 BPM

## 2017-05-25 VITALS — HEART RATE: 60 BPM

## 2017-05-25 VITALS — DIASTOLIC BLOOD PRESSURE: 70 MMHG | SYSTOLIC BLOOD PRESSURE: 119 MMHG | RESPIRATION RATE: 18 BRPM

## 2017-05-25 VITALS — DIASTOLIC BLOOD PRESSURE: 68 MMHG | RESPIRATION RATE: 18 BRPM | SYSTOLIC BLOOD PRESSURE: 110 MMHG

## 2017-05-25 VITALS — SYSTOLIC BLOOD PRESSURE: 137 MMHG | DIASTOLIC BLOOD PRESSURE: 79 MMHG | HEART RATE: 56 BPM

## 2017-05-25 VITALS — HEART RATE: 44 BPM

## 2017-05-25 VITALS — HEART RATE: 40 BPM

## 2017-05-25 VITALS — SYSTOLIC BLOOD PRESSURE: 122 MMHG | RESPIRATION RATE: 20 BRPM | DIASTOLIC BLOOD PRESSURE: 68 MMHG

## 2017-05-25 VITALS — HEART RATE: 52 BPM

## 2017-05-25 LAB
ADD UMIC: NO
APTT BLD: 67.8 SEC (ref 25–35)
APTT BLD: 69.5 SEC (ref 25–35)
APTT BLD: 87.5 SEC (ref 25–35)
COLOR UR: (no result)
GLUCOSE UR STRIP-MCNC: NEGATIVE %
INR PPP: 1.03
INR PPP: 1.08
INR PPP: 1.09
KETONES UR STRIP.AUTO-MCNC: NEGATIVE MG/DL
NITRITE UR QL STRIP.AUTO: NEGATIVE
PROTHROMBIN TIME: 13.5 SEC (ref 12.2–14.2)
PROTHROMBIN TIME: 14 SEC (ref 12.2–14.2)
PROTHROMBIN TIME: 14.1 SEC (ref 12.2–14.2)
PT RATIO: 1.1
RBC # UR AUTO: NEGATIVE /UL
URINE BILIRUBIN (DIP): NEGATIVE
URINE TOTAL PROTEIN (DIP): NEGATIVE
UROBILINOGEN UR STRIP-ACNC: (no result) (ref 0.1–1)
WBC # UR STRIP: NEGATIVE /UL

## 2017-05-25 RX ADMIN — HEPARIN SODIUM AND DEXTROSE SCH MLS/HR: 10000; 5 INJECTION INTRAVENOUS at 00:54

## 2017-05-25 RX ADMIN — HEPARIN SODIUM AND DEXTROSE SCH MLS/HR: 10000; 5 INJECTION INTRAVENOUS at 06:20

## 2017-05-25 RX ADMIN — KETOROLAC TROMETHAMINE PRN MG: 30 INJECTION, SOLUTION INTRAMUSCULAR at 11:42

## 2017-05-25 RX ADMIN — PREGABALIN SCH MG: 75 CAPSULE ORAL at 20:28

## 2017-05-25 RX ADMIN — FAMOTIDINE SCH MG: 20 TABLET ORAL at 08:54

## 2017-05-25 RX ADMIN — METOPROLOL TARTRATE SCH MG: 25 TABLET, FILM COATED ORAL at 08:54

## 2017-05-25 RX ADMIN — ATORVASTATIN CALCIUM SCH MG: 20 TABLET, FILM COATED ORAL at 20:29

## 2017-05-25 RX ADMIN — METOPROLOL TARTRATE SCH MG: 25 TABLET, FILM COATED ORAL at 20:28

## 2017-05-25 RX ADMIN — BUSPIRONE HYDROCHLORIDE SCH MG: 10 TABLET ORAL at 08:54

## 2017-05-25 RX ADMIN — FAMOTIDINE SCH MG: 20 TABLET ORAL at 20:28

## 2017-05-25 RX ADMIN — HEPARIN SODIUM AND DEXTROSE SCH MLS/HR: 10000; 5 INJECTION INTRAVENOUS at 09:11

## 2017-05-25 RX ADMIN — BUSPIRONE HYDROCHLORIDE SCH MG: 10 TABLET ORAL at 20:27

## 2017-05-25 RX ADMIN — ASPIRIN SCH MG: 81 TABLET, COATED ORAL at 08:54

## 2017-05-25 RX ADMIN — PREGABALIN SCH MG: 75 CAPSULE ORAL at 08:54

## 2017-05-25 RX ADMIN — HEPARIN SODIUM AND DEXTROSE SCH MLS/HR: 10000; 5 INJECTION INTRAVENOUS at 20:51

## 2017-05-25 RX ADMIN — POLYETHYLENE GLYCOL 3350 SCH GM: 17 POWDER, FOR SOLUTION ORAL at 08:55

## 2017-05-25 NOTE — CONS
Date/Time of Note


Date/Time of Note


DATE: 5/25/17 


TIME: 14:19





Assessment/Plan


Assessment/Plan


Chief Complaint/Hosp Course


53 yo male s/p knee surgery in Jan 2017 and then fall on April 22, who now 

presents with LLE DVT and extensive bilateral pulmonary emboli. Although it is 

likely that the VTE is provoked from the recent fall and surgery, it is 

reasonable to perform a hypercoagulable workup given the extensive nature of 

the VTE. 








-f/u Factor V Leiden Mutation, Prothrombin Gene Mutation, Protein C levels, 

Protein S levels. 


-although patient is on a heparin drip will check antithrombin III levels 

although this might not be reliable while patient is on a heparin drip


-will need to rule out antiphospholipid syndrome. check for Lupus anticoagulant

, B2 GP and Anticardiolipin antibodies


-will start Eliquis 5mg BID for now


-appreciate vascular surgery and pulmonary recs. given that patient has not 

failed anticoagulation and he stable from a cardiac/ pulmonary standpoint , we 

can hold on an IVC filter at this time. When he gets surgery in the future we 

can place a temporary filter at that time





Approximately 40 min were spent at patient's bedside and in coordination of his 

care


Problems:  





Consultation Date/Type/Reason


Admit Date/Time


May 21, 2017 at 14:45


Initial Consult Date


5/22/17


Type of Consultation:  hematology


Reason for Consultation


DVT and PE


Referring Provider:  JO LEE





24 HR Interval Summary


Free Text/Dictation


no acute overnight events. pt feeling better. pt still with chest pain   which 

is getting better





Exam/Review of Systems


Vital Signs


Vitals





 Vital Signs








  Date Time  Temp Pulse Resp B/P Pulse Ox O2 Delivery O2 Flow Rate FiO2


 


5/25/17 12:00  50      


 


5/25/17 11:16 97.8  20 103/59 98   


 


5/25/17 08:00      Nasal Cannula 3.0 


 


5/24/17 14:44        36














 Intake and Output   


 


 5/24/17 5/24/17 5/25/17





 15:00 23:00 07:00


 


Intake Total 55.5 ml 964.5 ml 850 ml


 


Output Total  1000 ml 1200 ml


 


Balance 55.5 ml -35.5 ml -350 ml











Exam


Constitutional:  alert, oriented


Psych:  no complaints


Head:  atraumatic, normocephalic


Eyes:  nl conjunctiva


ENMT:  nl external ears & nose


Neck:  non-tender, supple


Respiratory:  clear to auscultation, normal air movement


Cardiovascular:  nl pulses, regular rate and rhythm


Gastrointestinal:  soft


Musculoskeletal:  nl extremities to inspection, nl gait and stance


Extremities:  normal pulses





Results


Result Diagram:  


5/24/17 0720                                                                   

             5/24/17 0720





Results 24 hrs





Laboratory Tests








Test


  5/24/17


16:36 5/24/17


22:50 5/25/17


06:35


 


Activated Partial


Thromboplast Time 85.9  *H


  113.8  *H


  67.8  H


 


 


Prothrombin Time   14.0  


 


Prothrombin Time Ratio   1.1  


 


INR International Normalized


Ratio 


  


  1.08  


 


 


Troponin I   < 0.012  











Medications


Medications





 Current Medications


Buspirone HCl (Buspar) 10 mg BID PO  Last administered on 5/25/17at 08:54; 

Admin Dose 10 MG;  Start 5/21/17 at 21:00


Pregabalin (Lyrica) 75 mg BID PO  Last administered on 5/25/17at 08:54; Admin 

Dose 75 MG;  Start 5/21/17 at 21:00


Morphine Sulfate (morphine) 2 mg Q4H  PRN IV pain Last administered on 5/24/ 17at 17:12; Admin Dose 2 MG;  Start 5/21/17 at 18:00


Polyethylene Glycol (Miralax) 8.5 gm DAILY PO  Last administered on 5/24/17at 08

:57; Admin Dose 8.5 GM;  Start 5/22/17 at 09:00


Aspirin (Halfprin) 81 mg DAILY PO  Last administered on 5/25/17at 08:54; Admin 

Dose 81 MG;  Start 5/22/17 at 09:00


Atorvastatin Calcium (Lipitor) 20 mg HS PO  Last administered on 5/24/17at 21:32

; Admin Dose 20 MG;  Start 5/21/17 at 21:00


Methocarbamol (Robaxin) 500 mg BID  PRN PO msc spasm Last administered on 5/23/ 17at 08:09; Admin Dose 500 MG;  Start 5/21/17 at 18:30


Ondansetron HCl (Zofran Inj) 4 mg Q6H  PRN IV NAUSEA AND/OR VOMITING;  Start 5/ 21/17 at 18:30


Amoxicillin (Amoxicillin) 500 mg Q8 PO  Last administered on 5/25/17at 06:11; 

Admin Dose 500 MG;  Start 5/21/17 at 22:00


Metoprolol Tartrate (Lopressor) 25 mg BID PO  Last administered on 5/24/17at 21:

33; Admin Dose 25 MG;  Start 5/22/17 at 21:00


Morphine Sulfate (morphine) 4 mg Q4H  PRN IM PAIN LEVEL 6-10 Last administered 

on 5/24/17at 20:08; Admin Dose 4 MG;  Start 5/22/17 at 23:00


Famotidine (Pepcid) 20 mg BID PO  Last administered on 5/25/17at 08:54; Admin 

Dose 20 MG;  Start 5/23/17 at 21:00


Menthol/Methyl Salicylate (Srini Pierre) 1 applic TID TOP  Last administered on 5/25/ 17at 08:55; Admin Dose 1 APPLIC;  Start 5/24/17 at 21:00


Nitroglycerin (Nitroglycerin (Sl Tab) 0.4 Mg) 1 tab Q5M  PRN SL ANGINA;  Start 5 /24/17 at 16:10


Ketorolac Tromethamine (Toradol) 15 mg Q6H  PRN IV PAIN;  Start 5/25/17 at 14:00

;  Stop 5/28/17 at 13:59











YEVGENIY TSAI M.D. May 25, 2017 14:23

## 2017-05-25 NOTE — PN
Date/Time of Note


Date/Time of Note


DATE: 5/25/17 


TIME: 12:51





Assessment/Plan


VTE Prophylaxis


VTE Prophylaxis Intervention:  heparin





Lines/Catheters


IV Catheter Type (from [facility]):  Peripheral IV


Urinary Cath still in place:  No





Assessment/Plan


Chief Complaint/Hosp Course


1. L lower extremity  DVT with extensive bilateral pulmonary emboli . His 

pleuritic chest pain has decreased significantly . He is on a heparin drip 

which will be continued for 1 more day and he will be transitioned over to 

Eliquis .


2. elevated troponin 


3. anxiety / depression


4. chronic low back and knee pain .


Problems:  





Subjective


24 Hr Interval Summary


Free Text/Dictation


I am taking over primary care of this patient as per patients request . I spoke 

to Dr Welsh and he is comfortable with that change . The patient is having less 

chest pain . He denies SOB .


Constitutional:  improved, no complaints


Eyes:  no complaints


Respiratory:  pain


Cardiovascular:  edema, no complaints


Gastrointestinal:  no complaints


Genitourinary:  no complaints


Neurologic:  no complaints





Exam/Review of Systems


Vital Signs


Vitals





 Vital Signs








  Date Time  Temp Pulse Resp B/P Pulse Ox O2 Delivery O2 Flow Rate FiO2


 


5/25/17 12:00  50      


 


5/25/17 11:16 97.8  20 103/59 98   


 


5/25/17 08:00      Nasal Cannula 3.0 


 


5/24/17 14:44        36














 Intake and Output   


 


 5/24/17 5/24/17 5/25/17





 15:00 23:00 07:00


 


Intake Total 55.5 ml 964.5 ml 850 ml


 


Output Total  1000 ml 1200 ml


 


Balance 55.5 ml -35.5 ml -350 ml











Exam


Constitutional:  alert, oriented, well developed


Respiratory:  clear to auscultation, normal air movement


Cardiovascular:  regular rate and rhythm


Gastrointestinal:  non-tender, soft


Musculoskeletal:  swelling





Results


Result Diagram:  


5/24/17 0720                                                                   

             5/24/17 0720





Results 24 hrs





Laboratory Tests








Test


  5/24/17


16:36 5/24/17


22:50 5/25/17


06:35


 


Activated Partial


Thromboplast Time 85.9  *H


  113.8  *H


  67.8  H


 


 


Prothrombin Time   14.0  


 


Prothrombin Time Ratio   1.1  


 


INR International Normalized


Ratio 


  


  1.08  


 


 


Troponin I   < 0.012  











Medications


Medications





 Current Medications


Buspirone HCl (Buspar) 10 mg BID PO  Last administered on 5/25/17at 08:54; 

Admin Dose 10 MG;  Start 5/21/17 at 21:00


Pregabalin (Lyrica) 75 mg BID PO  Last administered on 5/25/17at 08:54; Admin 

Dose 75 MG;  Start 5/21/17 at 21:00


Morphine Sulfate (morphine) 2 mg Q4H  PRN IV pain Last administered on 5/24/ 17at 17:12; Admin Dose 2 MG;  Start 5/21/17 at 18:00


Polyethylene Glycol (Miralax) 8.5 gm DAILY PO  Last administered on 5/24/17at 08

:57; Admin Dose 8.5 GM;  Start 5/22/17 at 09:00


Aspirin (Halfprin) 81 mg DAILY PO  Last administered on 5/25/17at 08:54; Admin 

Dose 81 MG;  Start 5/22/17 at 09:00


Atorvastatin Calcium (Lipitor) 20 mg HS PO  Last administered on 5/24/17at 21:32

; Admin Dose 20 MG;  Start 5/21/17 at 21:00


Methocarbamol (Robaxin) 500 mg BID  PRN PO msc spasm Last administered on 5/23/ 17at 08:09; Admin Dose 500 MG;  Start 5/21/17 at 18:30


Ondansetron HCl (Zofran Inj) 4 mg Q6H  PRN IV NAUSEA AND/OR VOMITING;  Start 5/ 21/17 at 18:30


Amoxicillin (Amoxicillin) 500 mg Q8 PO  Last administered on 5/25/17at 06:11; 

Admin Dose 500 MG;  Start 5/21/17 at 22:00


Metoprolol Tartrate (Lopressor) 25 mg BID PO  Last administered on 5/24/17at 21:

33; Admin Dose 25 MG;  Start 5/22/17 at 21:00


Morphine Sulfate (morphine) 4 mg Q4H  PRN IM PAIN LEVEL 6-10 Last administered 

on 5/24/17at 20:08; Admin Dose 4 MG;  Start 5/22/17 at 23:00


Ketorolac Tromethamine (Toradol) 30 mg Q6H  PRN IV PAIN Last administered on 5/ 25/17at 11:42; Admin Dose 30 MG;  Start 5/23/17 at 02:00;  Stop 5/26/17 at 01:59


Famotidine (Pepcid) 20 mg BID PO  Last administered on 5/25/17at 08:54; Admin 

Dose 20 MG;  Start 5/23/17 at 21:00


Menthol/Methyl Salicylate (Srini Pierre) 1 applic TID TOP  Last administered on 5/25/ 17at 08:55; Admin Dose 1 APPLIC;  Start 5/24/17 at 21:00


Nitroglycerin (Nitroglycerin (Sl Tab) 0.4 Mg) 1 tab Q5M  PRN SL ANGINA;  Start 5 /24/17 at 16:10











TATA JASON MD May 25, 2017 13:03

## 2017-05-26 VITALS — SYSTOLIC BLOOD PRESSURE: 110 MMHG | DIASTOLIC BLOOD PRESSURE: 59 MMHG | RESPIRATION RATE: 16 BRPM

## 2017-05-26 VITALS — RESPIRATION RATE: 20 BRPM | SYSTOLIC BLOOD PRESSURE: 105 MMHG | DIASTOLIC BLOOD PRESSURE: 49 MMHG

## 2017-05-26 VITALS — HEART RATE: 56 BPM

## 2017-05-26 VITALS — DIASTOLIC BLOOD PRESSURE: 59 MMHG | SYSTOLIC BLOOD PRESSURE: 109 MMHG | RESPIRATION RATE: 20 BRPM

## 2017-05-26 VITALS — SYSTOLIC BLOOD PRESSURE: 120 MMHG | DIASTOLIC BLOOD PRESSURE: 54 MMHG | RESPIRATION RATE: 20 BRPM

## 2017-05-26 VITALS — RESPIRATION RATE: 20 BRPM | SYSTOLIC BLOOD PRESSURE: 110 MMHG | DIASTOLIC BLOOD PRESSURE: 60 MMHG

## 2017-05-26 VITALS — HEART RATE: 63 BPM

## 2017-05-26 VITALS — HEART RATE: 61 BPM

## 2017-05-26 VITALS — SYSTOLIC BLOOD PRESSURE: 117 MMHG | DIASTOLIC BLOOD PRESSURE: 66 MMHG | RESPIRATION RATE: 20 BRPM

## 2017-05-26 VITALS — HEART RATE: 53 BPM

## 2017-05-26 LAB
ADD SCAN DIFF: NO
ALBUMIN SERPL-MCNC: 3 G/DL (ref 3.3–4.9)
ALBUMIN/GLOB SERPL: 1 {RATIO}
ALP SERPL-CCNC: 76 IU/L (ref 42–121)
ALT SERPL-CCNC: 52 IU/L (ref 13–69)
ANION GAP SERPL CALC-SCNC: 8 MMOL/L (ref 8–16)
AST SERPL-CCNC: 30 IU/L (ref 15–46)
BASOPHILS # BLD AUTO: 0.1 10^3/UL (ref 0–0.1)
BASOPHILS NFR BLD: 0.8 % (ref 0–2)
BILIRUB DIRECT SERPL-MCNC: 0 MG/DL (ref 0–0.2)
BILIRUB SERPL-MCNC: 0 MG/DL (ref 0.2–1.3)
BUN SERPL-MCNC: 12 MG/DL (ref 7–20)
CALCIUM SERPL-MCNC: 8.8 MG/DL (ref 8.4–10.2)
CHLORIDE SERPL-SCNC: 105 MMOL/L (ref 97–110)
CO2 SERPL-SCNC: 29 MMOL/L (ref 21–31)
CREAT SERPL-MCNC: 1 MG/DL (ref 0.61–1.24)
EOSINOPHIL # BLD: 0.4 10^3/UL (ref 0–0.5)
EOSINOPHIL NFR BLD: 5.4 % (ref 0–7)
ERYTHROCYTE [DISTWIDTH] IN BLOOD BY AUTOMATED COUNT: 12.5 % (ref 11.5–14.5)
GLOBULIN SER-MCNC: 3 G/DL (ref 1.3–3.2)
GLUCOSE SERPL-MCNC: 89 MG/DL (ref 70–220)
HCT VFR BLD CALC: 38 % (ref 42–52)
HGB BLD-MCNC: 12.2 G/DL (ref 14–18)
LYMPHOCYTES # BLD AUTO: 1.6 10^3/UL (ref 0.8–2.9)
LYMPHOCYTES NFR BLD AUTO: 24.4 % (ref 15–51)
MAGNESIUM SERPL-MCNC: 2.1 MG/DL (ref 1.7–2.5)
MCH RBC QN AUTO: 28.4 PG (ref 29–33)
MCHC RBC AUTO-ENTMCNC: 32.1 G/DL (ref 32–37)
MCV RBC AUTO: 88.4 FL (ref 82–101)
MONOCYTES # BLD: 0.8 10^3/UL (ref 0.3–0.9)
MONOCYTES NFR BLD: 11.9 % (ref 0–11)
NEUTROPHILS # BLD: 3.8 10^3/UL (ref 1.6–7.5)
NEUTROPHILS NFR BLD AUTO: 56.7 % (ref 39–77)
NRBC # BLD MANUAL: 0 10^3/UL (ref 0–0)
NRBC BLD QL: 0 /100WBC (ref 0–0)
PHOSPHATE SERPL-MCNC: 4.5 MG/DL (ref 2.5–4.9)
PLATELET # BLD: 246 10^3/UL (ref 140–415)
PMV BLD AUTO: 10.2 FL (ref 7.4–10.4)
POTASSIUM SERPL-SCNC: 4.3 MMOL/L (ref 3.5–5.1)
PROT SERPL-MCNC: 6 G/DL (ref 6.1–8.1)
RBC # BLD AUTO: 4.3 10^6/UL (ref 4.7–6.1)
SODIUM SERPL-SCNC: 138 MMOL/L (ref 135–144)
WBC # BLD AUTO: 6.6 10^3/UL (ref 4.8–10.8)

## 2017-05-26 RX ADMIN — POLYETHYLENE GLYCOL 3350 SCH GM: 17 POWDER, FOR SOLUTION ORAL at 08:11

## 2017-05-26 RX ADMIN — PREGABALIN SCH MG: 75 CAPSULE ORAL at 08:10

## 2017-05-26 RX ADMIN — ASPIRIN SCH MG: 81 TABLET, COATED ORAL at 08:10

## 2017-05-26 RX ADMIN — BUSPIRONE HYDROCHLORIDE SCH MG: 10 TABLET ORAL at 08:10

## 2017-05-26 RX ADMIN — METOPROLOL TARTRATE SCH MG: 25 TABLET, FILM COATED ORAL at 08:11

## 2017-05-26 RX ADMIN — FAMOTIDINE SCH MG: 20 TABLET ORAL at 08:10

## 2017-05-26 NOTE — DS
DATE OF ADMISSION: 2017

DATE OF DISCHARGE: 2017

 

HISTORY OF PRESENT ILLNESS AND HOSPITAL COURSE:  This 52-year-old man was 
admitted through the emergency room at [facility] when he 
presented on 2017 complaining of left lower leg pain and swelling for 
about a month.  The pain had increased in the past few days prior to admission.
  The patient had an ultrasound done at the emergency room and was found to 
have an occlusive acute deep venous thrombosis of the left lower extremity 
involving the mid and distal portions of the femoral vein as well as the 
popliteal vein.  The patient then underwent a CT angiogram of the chest which 
showed extensive bilateral pulmonary emboli with near occlusive thrombus distal 
right main pulmonary artery . The patient was admitted by the hospitalist 
service at the hospital and was started on a heparin drip.  Consultations were 
obtained with Dr. Serafin Fine, the cardiologist, Dr. Mickey Maxwell, a 
pulmonologist, Dr. Gerald Little, a vascular surgeon and Dr. Dunia Sifuentes,  a 
hematology oncologist.  The patient was fully evaluated by all these doctors.  
I was asked to take over the patient's care the day prior to his discharge 
because I had seen the patient in the past and do primary care. and the patient 
will follow up with me.  The patient was on the heparin drip for 5 days as per 
recommendations by Dr. Maxwell, the pulmonologist.  He was then transitioned over 
to Eliquis 10 mg twice a day for 1 week.  The consensus of opinion was that the 
patient should be on anticoagulants for at least 6 months.  The patient had a 
hematology workup to rule out a predisposition for venous thromboses.  Those 
results were pending at the time of discharge.  The patient was ambulatory at 
the time of discharge.  The patient was in good condition.  The patient will be 
discharged home to the care of his wife.  The patient did have some pleuritic 
chest pain and shortness of breath; however, that resolved at the time of 
discharge.  His left leg pain and swelling were decreasing at the time of 
discharge. The question of placement of an inferior vena cava filter was 
entertained.  Dr. Gerald iLttle, a vascular surgeon, felt that at this time 
this was not indicated and the preferential treatment of choice would be 
anticoagulation.  If there were some reason that the patient could not continue 
the anticoagulation or needed to be taken off, then an inferior vena cava 
filter would be placed.  The patient does have a chronic pain syndrome and will 
be discharged on his pain medications which he was taking preoperatively.  He 
was also on amoxicillin preadmission because of a tooth abscess.  The patient 
did have a left knee arthroscopy after a torn meniscus.  This was done in 
2017 . He then he had a left knee injury after he fell a day after 
getting an epidural injection in late 2017 .

 

DISCHARGE MEDICATIONS:  Include the followin.  Eliquis 10 mg twice a day for 1 week, then 5 mg twice a day for 6 months .

2.  Famotidine 20 mg twice a day.

3.  MiraLax once a day.

4.  Amoxicillin 500 mg 3 times a day for a total of 1 week.  

5.  BuSpar 10 mg twice a day.

6.  Lyrica 75 mg twice a day.

7.  Atorvastatin 20 mg at bedtime.

8.  Robaxin 500 mg twice a day.  

 

The patient was instructed to see me in my office in 2 weeks.

 

DISCHARGE DIAGNOSES:

1.  Deep venous thrombosis of the left lower leg with multiple and bilateral 
pulmonary embolism.

2.  Chronic low back pain.

3.  Left knee pain due to torn meniscus.

 

 

Dictated By: TATA JASON MD, ND/SARTHAK

DD:    2017 16:28:55

DT:    2017 17:30:40

Conf#: [id number]

DID#:  [id number]

 

MTDD

## 2017-05-26 NOTE — PN
Date/Time of Note


Date/Time of Note


DATE: 5/26/17 


TIME: 00:00





Assessment/Plan


Lines/Catheters


IV Catheter Type (from [facility]):  Peripheral IV


Bennett in Place (from [facility]):  No





Assessment/Plan


Chief Complaint/Hosp Course


-Left lower extremity deep vein thrombosis of the femoral vein and bilateral 

pulmonary emboli:   It seems the patient has developed a provoked deep vein 

thrombosis of his left lower extremity with findings of pulmonary embolism.  

Unfortunately, as the patient's clot seems to be in the distal femoral vein, no 

further intervention from an endovascular approach will be needed.  Further, 

the patient would not be able to tolerate laying in the prone position for an 

intervention.  It is unclear if the patient had left lower extremity longer 

than 14 days as there will be no benefit for any endovascular intervention to 

prevent post-thrombotic syndrome in the near future.


-Recommend for the patient continue anticoagulation


-Will follow hypercoagulable workup and appreciate hematology evaluations.


-No indication for an IVC filter placement at this time, However if our 

orthopedic colleagues plan to do any further interventions in the near future 

will plan for IVC filter placement prior to surgery


Optimize vascular status (BP meds, diet, nutrition, exercise, sugar control, 

antiplatelets, weight loss and anticoagulation). 


-Discussed findings, plan and management with the patient and his partner at 

the bedside.  


-Discussed smoking cessation with the patient.


-Thank you for allowing us to participate in the care of your patient.  Please 

call with any questions.


Problems:  





Subjective


24 Hr Interval Summary


Constitutional:  no complaints





Exam/Review of Systems


Vital Signs


Vitals





 Vital Signs








  Date Time  Temp Pulse Resp B/P Pulse Ox O2 Delivery O2 Flow Rate FiO2


 


5/26/17 00:17 97.6 72 16 110/59 97   


 


5/25/17 15:20       4.0 


 


5/25/17 08:00      Nasal Cannula  


 


5/24/17 14:44        36














 Intake and Output   


 


 5/25/17 5/25/17 5/26/17





 15:00 23:00 07:00


 


Intake Total  600 ml 


 


Balance  600 ml 











Exam


Free Text/Dictation


GENERAL:  Alert and oriented x3


PULMONARY:  Coarse breath sounds bilaterally with some crackles at the bases.


CARDIOVASCULAR:  S1, S2 present. 


ABDOMEN:  Soft, nontender, nondistended.  Bowel sounds positive.  Truncal 

obesity.  


EXTREMITIES:


Right lower extremity palpable femoral pulse, palpable pedal pulse.  Motor, 

sensory intact.  Cap refill 2 to 3 seconds.


Left lower extremity palpable femoral pulse, palpable pedal pulse.  Motor, 

sensory intact.  Capillary refill 2 to 3 seconds.  Edema 2+ and tender upon 

palpation of the calf





Results


Result Diagram:  


5/24/17 0720                                                                   

             5/24/17 0720














GUILLERMO ALSTON MD May 26, 2017 00:00

## 2017-05-26 NOTE — CONS
Date/Time of Note


Date/Time of Note


DATE: 5/26/17 


TIME: 09:50





Assessment/Plan


Assessment/Plan


Additional Assessment/Plan


Assessment recommendations;





1.  Patient admitted for bilateral PE as well as left lower extremity DVT.  

Switched over to apixaban today.


2.  Recent history of knee surgery in January of this year and according to the 

patient he was not mobile enough that possibly could be precipitating factor.


3.  History of hypertension.


4.  History of chronic pain.


5.  History of back surgery.





Continue apixaban per protocol.  Patient will need at least 6 months of 

treatment.





Consultation Date/Type/Reason


Admit Date/Time


May 21, 2017 at 14:45


Initial Consult Date


5/22/17


Type of Consultation:  Pulmonary


Referring Provider:  JO LEE





24 HR Interval Summary


Free Text/Dictation


Patient's condition is improving.  Denies any shortness of breath.  Left-sided 

chest pain also is improving.  Patient denies any significant left lower 

extremity pain as well.





General exam; young male, awake alert currently in no distress.





Exam/Review of Systems


Vital Signs


Vitals





 Vital Signs








  Date Time  Temp Pulse Resp B/P Pulse Ox O2 Delivery O2 Flow Rate FiO2


 


5/26/17 08:20  53      


 


5/26/17 07:54 98.6  20 117/66 94   


 


5/26/17 04:36       4.0 


 


5/25/17 21:00      Nasal Cannula  


 


5/24/17 14:44        36














 Intake and Output   


 


 5/25/17 5/25/17 5/26/17





 15:00 23:00 07:00


 


Intake Total  600 ml 


 


Balance  600 ml 











Exam


HEENT examination; supple neck, no JVD.  No lymphadenopathy.  Midline trachea.  

No thyromegaly.  Pharynx is clear.  Patient has good dentition.  





Chest examination; clear to auscultation.  S1-S2 audible, no murmurs.  Regular 

rhythm.





Abdomen examination; soft, bowel sounds audible no organomegaly.





Extremity exam is; there is mild left calf tenderness.  No peripheral edema.  

Pulses 1+ bilaterally.  





CNS examination; no focal deficit.





Results


Result Diagram:  


5/26/17 0655                                                                   

             5/26/17 0635





Results 24 hrs





Laboratory Tests








Test


  5/25/17


15:20 5/25/17


16:00 5/25/17


21:10 5/26/17


06:35


 


Prothrombin Time 14.1    13.5   


 


Prothrombin Time Ratio 1.1    1.1   


 


INR International Normalized


Ratio 1.09  


  


  1.03  


  


 


 


Activated Partial


Thromboplast Time 87.5  *H


  


  69.5  H


  91.6  *H


 


 


Urine Color  LT. YELLOW    


 


Urine Clarity  CLEAR    


 


Urine pH  6.0    


 


Urine Specific Gravity  <=1.005  L  


 


Urine Ketones  NEGATIVE    


 


Urine Nitrite  NEGATIVE    


 


Urine Bilirubin  NEGATIVE    


 


Urine Urobilinogen  0.2  E.U./dL    


 


Urine Leukocyte Esterase  NEGATIVE    


 


Urine Hemoglobin  NEGATIVE    


 


Urine Glucose  NEGATIVE    


 


Urine Total Protein  NEGATIVE    


 


Troponin I   < 0.012   


 


Sodium Level    138  


 


Potassium Level    4.3  


 


Chloride Level    105  


 


Carbon Dioxide Level    29  


 


Anion Gap    8  


 


Blood Urea Nitrogen    12  


 


Creatinine    1.00  


 


Glucose Level    89  


 


Calcium Level    8.8  


 


Phosphorus Level    4.5  


 


Magnesium Level    2.1  


 


Total Bilirubin    0.0  L


 


Direct Bilirubin    0.00  


 


Indirect Bilirubin    0.0  


 


Aspartate Amino Transf


(AST/SGOT) 


  


  


  30  


 


 


Alanine Aminotransferase


(ALT/SGPT) 


  


  


  52  


 


 


Alkaline Phosphatase    76  


 


Total Protein    6.0  L


 


Albumin    3.0  L


 


Globulin    3.00  


 


Albumin/Globulin Ratio    1.00  














Test


  5/26/17


06:55 5/26/17


07:37 


  


 


 


White Blood Count 6.6     


 


Red Blood Count 4.30  L   


 


Hemoglobin 12.2  L   


 


Hematocrit 38.0  L   


 


Mean Corpuscular Volume 88.4     


 


Mean Corpuscular Hemoglobin 28.4  L   


 


Mean Corpuscular Hemoglobin


Concent 32.1  


  


  


  


 


 


Red Cell Distribution Width 12.5     


 


Platelet Count 246  #   


 


Mean Platelet Volume 10.2     


 


Neutrophils % 56.7     


 


Lymphocytes % 24.4     


 


Monocytes % 11.9  H   


 


Eosinophils % 5.4     


 


Basophils % 0.8     


 


Nucleated Red Blood Cells % 0.0     


 


Neutrophils # 3.8     


 


Lymphocytes # 1.6     


 


Monocytes # 0.8     


 


Eosinophils # 0.4     


 


Basophils # 0.1     


 


Nucleated Red Blood Cells # 0.0     


 


Lab Scanned Report  REFERENCE LAB    











Medications


Medications





 Current Medications


Buspirone HCl (Buspar) 10 mg BID PO  Last administered on 5/26/17at 08:10; 

Admin Dose 10 MG;  Start 5/21/17 at 21:00


Pregabalin (Lyrica) 75 mg BID PO  Last administered on 5/26/17at 08:10; Admin 

Dose 75 MG;  Start 5/21/17 at 21:00


Polyethylene Glycol (Miralax) 8.5 gm DAILY PO  Last administered on 5/24/17at 08

:57; Admin Dose 8.5 GM;  Start 5/22/17 at 09:00


Aspirin (Halfprin) 81 mg DAILY PO  Last administered on 5/26/17at 08:10; Admin 

Dose 81 MG;  Start 5/22/17 at 09:00


Atorvastatin Calcium (Lipitor) 20 mg HS PO  Last administered on 5/25/17at 20:29

; Admin Dose 20 MG;  Start 5/21/17 at 21:00


Methocarbamol (Robaxin) 500 mg BID  PRN PO msc spasm Last administered on 5/23/ 17at 08:09; Admin Dose 500 MG;  Start 5/21/17 at 18:30


Ondansetron HCl (Zofran Inj) 4 mg Q6H  PRN IV NAUSEA AND/OR VOMITING;  Start 5/ 21/17 at 18:30


Amoxicillin (Amoxicillin) 500 mg Q8 PO  Last administered on 5/26/17at 06:07; 

Admin Dose 500 MG;  Start 5/21/17 at 22:00


Metoprolol Tartrate (Lopressor) 25 mg BID PO  Last administered on 5/25/17at 20:

28; Admin Dose 25 MG;  Start 5/22/17 at 21:00


Morphine Sulfate (morphine) 4 mg Q4H  PRN IM PAIN LEVEL 6-10 Last administered 

on 5/24/17at 20:08; Admin Dose 4 MG;  Start 5/22/17 at 23:00


Famotidine (Pepcid) 20 mg BID PO  Last administered on 5/26/17at 08:10; Admin 

Dose 20 MG;  Start 5/23/17 at 21:00


Menthol/Methyl Salicylate (Srini Pierre) 1 applic TID TOP  Last administered on 5/26/ 17at 08:11; Admin Dose 1 APPLIC;  Start 5/24/17 at 21:00


Nitroglycerin (Nitroglycerin (Sl Tab) 0.4 Mg) 1 tab Q5M  PRN SL ANGINA Last 

administered on 5/25/17at 20:31; Admin Dose 1 TAB;  Start 5/24/17 at 16:10


Patient Own Medication 1 ea TID  PRN PO PAIN Last administered on 5/26/17at 06:

07; Admin Dose 1 EA;  Start 5/25/17 at 15:00


Patient Own Medication 1 ea QHS  PRN PO PAIN Last administered on 5/25/17at 20:

27; Admin Dose 1 EA;  Start 5/25/17 at 15:00


Apixaban (Eliquis) 10 mg BID PO  Last administered on 5/26/17at 09:05; Admin 

Dose 10 MG;  Start 5/26/17 at 09:00;  Stop 6/1/17 at 21:01


Apixaban (Eliquis) 5 mg BID PO ;  Start 6/2/17 at 09:00











SHIRIN LUCIO May 26, 2017 09:53

## 2017-05-26 NOTE — CONS
Date/Time of Note


Date/Time of Note


DATE: 5/26/17 


TIME: 10:57





Assessment/Plan


Assessment/Plan


Chief Complaint/Hosp Course


53 yo male s/p knee surgery in Jan 2017 and then fall on April 22, who now 

presents with LLE DVT and extensive bilateral pulmonary emboli. Although it is 

likely that the VTE is provoked from the recent fall and surgery, it is 

reasonable to perform a hypercoagulable workup given the extensive nature of 

the VTE. 








-f/u Factor V Leiden Mutation, Prothrombin Gene Mutation, Protein C levels, 

Protein S levels. 


-although patient is on a heparin drip will check antithrombin III levels 

although this might not be reliable while patient is on a heparin drip


-will need to rule out antiphospholipid syndrome. check for Lupus anticoagulant

, B2 GP and Anticardiolipin antibodies


-will start Eliquis 10mg BID for 7 days then switch to 5mg BID. pt will need at 

least 6 months of anticoagulation. He may need lifelong depending on the 

results of the hypercoag panel


-appreciate vascular surgery and pulmonary recs. given that patient has not 

failed anticoagulation and he stable from a cardiac/ pulmonary standpoint , we 

can hold on an IVC filter at this time. When he gets surgery in the future we 

can place a temporary filter at that time





Approximately 40 min were spent at patient's bedside and in coordination of his 

care


Problems:  





Consultation Date/Type/Reason


Admit Date/Time


May 21, 2017 at 14:45


Initial Consult Date


5/22/17


Type of Consultation:  hematology


Reason for Consultation


LLE DVT and B pulmonary emboli


Referring Provider:  JO LEE





24 HR Interval Summary


Free Text/Dictation


chest pain improving. shortness of breath improving





Exam/Review of Systems


Vital Signs


Vitals





 Vital Signs








  Date Time  Temp Pulse Resp B/P Pulse Ox O2 Delivery O2 Flow Rate FiO2


 


5/26/17 08:20  53      


 


5/26/17 08:15      Nasal Cannula 2.0 


 


5/26/17 07:54 98.6  20 117/66 94   


 


5/24/17 14:44        36














 Intake and Output   


 


 5/25/17 5/25/17 5/26/17





 15:00 23:00 07:00


 


Intake Total  600 ml 


 


Balance  600 ml 











Exam


Constitutional:  alert, oriented


Psych:  no complaints


Head:  atraumatic, normocephalic


Eyes:  nl conjunctiva


ENMT:  nl external ears & nose


Neck:  non-tender, supple


Respiratory:  clear to auscultation, normal air movement


Cardiovascular:  nl pulses, regular rate and rhythm


Gastrointestinal:  soft


Musculoskeletal:  nl extremities to inspection, nl gait and stance, other (LLE 

swelling has improved. no longer warm to touch)


Extremities:  normal pulses





Results


Result Diagram:  


5/26/17 0655                                                                   

             5/26/17 0635





Results 24 hrs





Laboratory Tests








Test


  5/25/17


15:20 5/25/17


16:00 5/25/17


21:10 5/26/17


06:35


 


Prothrombin Time 14.1    13.5   


 


Prothrombin Time Ratio 1.1    1.1   


 


INR International Normalized


Ratio 1.09  


  


  1.03  


  


 


 


Activated Partial


Thromboplast Time 87.5  *H


  


  69.5  H


  91.6  *H


 


 


Urine Color  LT. YELLOW    


 


Urine Clarity  CLEAR    


 


Urine pH  6.0    


 


Urine Specific Gravity  <=1.005  L  


 


Urine Ketones  NEGATIVE    


 


Urine Nitrite  NEGATIVE    


 


Urine Bilirubin  NEGATIVE    


 


Urine Urobilinogen  0.2  E.U./dL    


 


Urine Leukocyte Esterase  NEGATIVE    


 


Urine Hemoglobin  NEGATIVE    


 


Urine Glucose  NEGATIVE    


 


Urine Total Protein  NEGATIVE    


 


Troponin I   < 0.012   


 


Sodium Level    138  


 


Potassium Level    4.3  


 


Chloride Level    105  


 


Carbon Dioxide Level    29  


 


Anion Gap    8  


 


Blood Urea Nitrogen    12  


 


Creatinine    1.00  


 


Glucose Level    89  


 


Calcium Level    8.8  


 


Phosphorus Level    4.5  


 


Magnesium Level    2.1  


 


Total Bilirubin    0.0  L


 


Direct Bilirubin    0.00  


 


Indirect Bilirubin    0.0  


 


Aspartate Amino Transf


(AST/SGOT) 


  


  


  30  


 


 


Alanine Aminotransferase


(ALT/SGPT) 


  


  


  52  


 


 


Alkaline Phosphatase    76  


 


Total Protein    6.0  L


 


Albumin    3.0  L


 


Globulin    3.00  


 


Albumin/Globulin Ratio    1.00  














Test


  5/26/17


06:55 5/26/17


07:37 


  


 


 


White Blood Count 6.6     


 


Red Blood Count 4.30  L   


 


Hemoglobin 12.2  L   


 


Hematocrit 38.0  L   


 


Mean Corpuscular Volume 88.4     


 


Mean Corpuscular Hemoglobin 28.4  L   


 


Mean Corpuscular Hemoglobin


Concent 32.1  


  


  


  


 


 


Red Cell Distribution Width 12.5     


 


Platelet Count 246  #   


 


Mean Platelet Volume 10.2     


 


Neutrophils % 56.7     


 


Lymphocytes % 24.4     


 


Monocytes % 11.9  H   


 


Eosinophils % 5.4     


 


Basophils % 0.8     


 


Nucleated Red Blood Cells % 0.0     


 


Neutrophils # 3.8     


 


Lymphocytes # 1.6     


 


Monocytes # 0.8     


 


Eosinophils # 0.4     


 


Basophils # 0.1     


 


Nucleated Red Blood Cells # 0.0     


 


Lab Scanned Report  REFERENCE LAB    











Medications


Medications





 Current Medications


Buspirone HCl (Buspar) 10 mg BID PO  Last administered on 5/26/17at 08:10; 

Admin Dose 10 MG;  Start 5/21/17 at 21:00


Pregabalin (Lyrica) 75 mg BID PO  Last administered on 5/26/17at 08:10; Admin 

Dose 75 MG;  Start 5/21/17 at 21:00


Polyethylene Glycol (Miralax) 8.5 gm DAILY PO  Last administered on 5/24/17at 08

:57; Admin Dose 8.5 GM;  Start 5/22/17 at 09:00


Aspirin (Halfprin) 81 mg DAILY PO  Last administered on 5/26/17at 08:10; Admin 

Dose 81 MG;  Start 5/22/17 at 09:00


Atorvastatin Calcium (Lipitor) 20 mg HS PO  Last administered on 5/25/17at 20:29

; Admin Dose 20 MG;  Start 5/21/17 at 21:00


Methocarbamol (Robaxin) 500 mg BID  PRN PO msc spasm Last administered on 5/23/ 17at 08:09; Admin Dose 500 MG;  Start 5/21/17 at 18:30


Ondansetron HCl (Zofran Inj) 4 mg Q6H  PRN IV NAUSEA AND/OR VOMITING;  Start 5/ 21/17 at 18:30


Amoxicillin (Amoxicillin) 500 mg Q8 PO  Last administered on 5/26/17at 06:07; 

Admin Dose 500 MG;  Start 5/21/17 at 22:00


Metoprolol Tartrate (Lopressor) 25 mg BID PO  Last administered on 5/25/17at 20:

28; Admin Dose 25 MG;  Start 5/22/17 at 21:00


Morphine Sulfate (morphine) 4 mg Q4H  PRN IM PAIN LEVEL 6-10 Last administered 

on 5/24/17at 20:08; Admin Dose 4 MG;  Start 5/22/17 at 23:00


Famotidine (Pepcid) 20 mg BID PO  Last administered on 5/26/17at 08:10; Admin 

Dose 20 MG;  Start 5/23/17 at 21:00


Menthol/Methyl Salicylate (Srini Pierre) 1 applic TID TOP  Last administered on 5/26/ 17at 08:11; Admin Dose 1 APPLIC;  Start 5/24/17 at 21:00


Nitroglycerin (Nitroglycerin (Sl Tab) 0.4 Mg) 1 tab Q5M  PRN SL ANGINA Last 

administered on 5/25/17at 20:31; Admin Dose 1 TAB;  Start 5/24/17 at 16:10


Patient Own Medication 1 ea TID  PRN PO PAIN Last administered on 5/26/17at 06:

07; Admin Dose 1 EA;  Start 5/25/17 at 15:00


Patient Own Medication 1 ea QHS  PRN PO PAIN Last administered on 5/25/17at 20:

27; Admin Dose 1 EA;  Start 5/25/17 at 15:00


Apixaban (Eliquis) 10 mg BID PO  Last administered on 5/26/17at 09:05; Admin 

Dose 10 MG;  Start 5/26/17 at 09:00;  Stop 6/1/17 at 21:01


Apixaban (Eliquis) 5 mg BID PO ;  Start 6/2/17 at 09:00











YEVGENIY TSAI M.D. May 26, 2017 11:02

## 2017-05-26 NOTE — RADRPT
Vent Rate: 54 bpm

RR Interval: 0 msec

AZ Interval: 162 msec

QRS Duration: 88 msec

QT Interval: 428 msec

QTC Interval: 405 msec

P-R-T Axis: 57 - -6 - 51 degrees

 

 Sinus bradycardia

 Septal infarct , age undetermined

 Abnormal ECG

 

Electronically Signed By: Albaro Shabazz

[id number]

## 2017-05-26 NOTE — CONS
Date/Time of Note


Date/Time of Note


DATE: 5/26/17 


TIME: 14:20





Assessment/Plan


Assessment/Plan


Chief Complaint/Hosp Course


IMPRESSION:


1.  Positive troponin in the setting of bilateral pulmonary emboli.-

downtrending. Likely due to PE type 2 infarct. NL EF by echo this admit


2.  Chest pain in the setting of bilateral pulmonary emboli.


3.  Dyspnea on exertion with known bilateral pulmonary emboli.


4.  Lower extremity edema with known lower extremity deep venous thrombosis.


5.  Anemia, mild.


6.  Ongoing tobacco usage.





Recc:


-Tele


-Continue asa/statin


-Now on eliquis


-trend cardiac enzymes


-Continue BB as tolerated


-continue abx's and f/u cx data


-outpatient f/u 3 weeks


Problems:  





Consultation Date/Type/Reason


Admit Date/Time


May 21, 2017 at 14:45


Initial Consult Date


5/22/17


Type of Consultation:  Cardiology


Reason for Consultation


positive troponin/CORNELIUS


Referring Provider:  JO LEE





Exam/Review of Systems


Vital Signs


Vitals





 Vital Signs








  Date Time  Temp Pulse Resp B/P Pulse Ox O2 Delivery O2 Flow Rate FiO2


 


5/26/17 12:03  61      


 


5/26/17 11:32 98.0  20 120/54 94   


 


5/26/17 08:15        


 


5/24/17 14:44        36














 Intake and Output   


 


 5/25/17 5/25/17 5/26/17





 15:00 23:00 07:00


 


Intake Total  600 ml 


 


Balance  600 ml 











Exam


Review of Systems:


CONSTITUTIONAL:  No fevers, chills.


PULMONARY:  No sob


CARDIOVASCULAR: No chest pain/palpitations


GASTROINTESTINAL:  No nausea/vomiting.


GENITOURINARY:  No hematuria/dysuria.


MUSCULOSKELETAL:  No myagias/arthalgias.


PSYCHIATRIC:  The patient denies depression.


NEUROLOGIC:   No weakness


Constitutional:  alert


Psych:  no complaints


Head:  normocephalic


ENMT:  mucosa pink and moist


Neck:  jvd (8-9 cm water), supple


Respiratory:  diminished breath sounds (at bases/B)


Cardiovascular:  regular rate and rhythm


Gastrointestinal:  non-tender, soft


Musculoskeletal:  muscle tone (normal)


Extremities:  edema (none)


Neurological:  other (No focal deficits)





Results


Result Diagram:  


5/26/17 0655                                                                   

             5/26/17 0635





Results 24 hrs





Laboratory Tests








Test


  5/25/17


15:20 5/25/17


16:00 5/25/17


21:10 5/26/17


06:35


 


Prothrombin Time 14.1    13.5   


 


Prothrombin Time Ratio 1.1    1.1   


 


INR International Normalized


Ratio 1.09  


  


  1.03  


  


 


 


Activated Partial


Thromboplast Time 87.5  *H


  


  69.5  H


  91.6  *H


 


 


Urine Color  LT. YELLOW    


 


Urine Clarity  CLEAR    


 


Urine pH  6.0    


 


Urine Specific Gravity  <=1.005  L  


 


Urine Ketones  NEGATIVE    


 


Urine Nitrite  NEGATIVE    


 


Urine Bilirubin  NEGATIVE    


 


Urine Urobilinogen  0.2  E.U./dL    


 


Urine Leukocyte Esterase  NEGATIVE    


 


Urine Hemoglobin  NEGATIVE    


 


Urine Glucose  NEGATIVE    


 


Urine Total Protein  NEGATIVE    


 


Troponin I   < 0.012   


 


Sodium Level    138  


 


Potassium Level    4.3  


 


Chloride Level    105  


 


Carbon Dioxide Level    29  


 


Anion Gap    8  


 


Blood Urea Nitrogen    12  


 


Creatinine    1.00  


 


Glucose Level    89  


 


Calcium Level    8.8  


 


Phosphorus Level    4.5  


 


Magnesium Level    2.1  


 


Total Bilirubin    0.0  L


 


Direct Bilirubin    0.00  


 


Indirect Bilirubin    0.0  


 


Aspartate Amino Transf


(AST/SGOT) 


  


  


  30  


 


 


Alanine Aminotransferase


(ALT/SGPT) 


  


  


  52  


 


 


Alkaline Phosphatase    76  


 


Total Protein    6.0  L


 


Albumin    3.0  L


 


Globulin    3.00  


 


Albumin/Globulin Ratio    1.00  














Test


  5/26/17


06:55 5/26/17


07:37 


  


 


 


White Blood Count 6.6     


 


Red Blood Count 4.30  L   


 


Hemoglobin 12.2  L   


 


Hematocrit 38.0  L   


 


Mean Corpuscular Volume 88.4     


 


Mean Corpuscular Hemoglobin 28.4  L   


 


Mean Corpuscular Hemoglobin


Concent 32.1  


  


  


  


 


 


Red Cell Distribution Width 12.5     


 


Platelet Count 246  #   


 


Mean Platelet Volume 10.2     


 


Neutrophils % 56.7     


 


Lymphocytes % 24.4     


 


Monocytes % 11.9  H   


 


Eosinophils % 5.4     


 


Basophils % 0.8     


 


Nucleated Red Blood Cells % 0.0     


 


Neutrophils # 3.8     


 


Lymphocytes # 1.6     


 


Monocytes # 0.8     


 


Eosinophils # 0.4     


 


Basophils # 0.1     


 


Nucleated Red Blood Cells # 0.0     


 


Lab Scanned Report  REFERENCE LAB    











Medications


Medications





 Current Medications


Buspirone HCl (Buspar) 10 mg BID PO  Last administered on 5/26/17at 08:10; 

Admin Dose 10 MG;  Start 5/21/17 at 21:00


Pregabalin (Lyrica) 75 mg BID PO  Last administered on 5/26/17at 08:10; Admin 

Dose 75 MG;  Start 5/21/17 at 21:00


Polyethylene Glycol (Miralax) 8.5 gm DAILY PO  Last administered on 5/24/17at 08

:57; Admin Dose 8.5 GM;  Start 5/22/17 at 09:00


Aspirin (Halfprin) 81 mg DAILY PO  Last administered on 5/26/17at 08:10; Admin 

Dose 81 MG;  Start 5/22/17 at 09:00


Atorvastatin Calcium (Lipitor) 20 mg HS PO  Last administered on 5/25/17at 20:29

; Admin Dose 20 MG;  Start 5/21/17 at 21:00


Methocarbamol (Robaxin) 500 mg BID  PRN PO msc spasm Last administered on 5/23/ 17at 08:09; Admin Dose 500 MG;  Start 5/21/17 at 18:30


Ondansetron HCl (Zofran Inj) 4 mg Q6H  PRN IV NAUSEA AND/OR VOMITING;  Start 5/ 21/17 at 18:30


Amoxicillin (Amoxicillin) 500 mg Q8 PO  Last administered on 5/26/17at 14:05; 

Admin Dose 500 MG;  Start 5/21/17 at 22:00


Metoprolol Tartrate (Lopressor) 25 mg BID PO  Last administered on 5/25/17at 20:

28; Admin Dose 25 MG;  Start 5/22/17 at 21:00


Morphine Sulfate (morphine) 4 mg Q4H  PRN IM PAIN LEVEL 6-10 Last administered 

on 5/24/17at 20:08; Admin Dose 4 MG;  Start 5/22/17 at 23:00


Famotidine (Pepcid) 20 mg BID PO  Last administered on 5/26/17at 08:10; Admin 

Dose 20 MG;  Start 5/23/17 at 21:00


Menthol/Methyl Salicylate (Srini Pierre) 1 applic TID TOP  Last administered on 5/26/ 17at 08:11; Admin Dose 1 APPLIC;  Start 5/24/17 at 21:00


Nitroglycerin (Nitroglycerin (Sl Tab) 0.4 Mg) 1 tab Q5M  PRN SL ANGINA Last 

administered on 5/25/17at 20:31; Admin Dose 1 TAB;  Start 5/24/17 at 16:10


Patient Own Medication 1 ea TID  PRN PO PAIN Last administered on 5/26/17at 11:

55; Admin Dose 1 EA;  Start 5/25/17 at 15:00


Patient Own Medication 1 ea QHS  PRN PO PAIN Last administered on 5/25/17at 20:

27; Admin Dose 1 EA;  Start 5/25/17 at 15:00


Apixaban (Eliquis) 10 mg BID PO  Last administered on 5/26/17at 09:05; Admin 

Dose 10 MG;  Start 5/26/17 at 09:00;  Stop 6/1/17 at 21:01


Apixaban (Eliquis) 5 mg BID PO ;  Start 6/2/17 at 09:00











CARL WALTON May 26, 2017 14:21

## 2017-05-26 NOTE — CONS
Date/Time of Note


Date/Time of Note


DATE: 5/26/17 


TIME: 08:33





Assessment/Plan


Assessment/Plan


Chief Complaint/Hosp Course


1. L lower extremity  DVT with extensive bilateral pulmonary emboli . His 

pleuritic chest pain has decreased significantly . He is on a heparin drip 

which will be stopped  and he will be transitioned over to Eliquis . I will 

discuss dose of Eliquis with Dr Randall .


2. elevated troponin 


3. anxiety / depression


4. chronic low back and knee pain .


Problems:  





Consultation Date/Type/Reason


Admit Date/Time


May 21, 2017 at 14:45


Initial Consult Date


5/22/17


Type of Consultation:  cardiology


Referring Provider:  JO LEE





24 HR Interval Summary


Free Text/Dictation


Patient is awake and alert . He says that he is having less L leg pain . 

Breathing is good with less SOB .


Constitutional:  improved, no complaints





Exam/Review of Systems


Vital Signs


Vitals





 Vital Signs








  Date Time  Temp Pulse Resp B/P Pulse Ox O2 Delivery O2 Flow Rate FiO2


 


5/26/17 08:20  53      


 


5/26/17 07:54 98.6  20 117/66 94   


 


5/26/17 04:36       4.0 


 


5/25/17 21:00      Nasal Cannula  


 


5/24/17 14:44        36














 Intake and Output   


 


 5/25/17 5/25/17 5/26/17





 15:00 23:00 07:00


 


Intake Total  600 ml 


 


Balance  600 ml 











Exam


Constitutional:  alert, oriented, well developed


Respiratory:  clear to auscultation, normal air movement


Cardiovascular:  edema, regular rate and rhythm


Gastrointestinal:  soft


Extremities:  edema





Results


Result Diagram:  


5/26/17 0655                                                                   

             5/26/17 0635





Results 24 hrs





Laboratory Tests








Test


  5/25/17


15:20 5/25/17


16:00 5/25/17


21:10 5/26/17


06:35


 


Prothrombin Time 14.1    13.5   


 


Prothrombin Time Ratio 1.1    1.1   


 


INR International Normalized


Ratio 1.09  


  


  1.03  


  


 


 


Activated Partial


Thromboplast Time 87.5  *H


  


  69.5  H


  91.6  *H


 


 


Urine Color  LT. YELLOW    


 


Urine Clarity  CLEAR    


 


Urine pH  6.0    


 


Urine Specific Gravity  <=1.005  L  


 


Urine Ketones  NEGATIVE    


 


Urine Nitrite  NEGATIVE    


 


Urine Bilirubin  NEGATIVE    


 


Urine Urobilinogen  0.2  E.U./dL    


 


Urine Leukocyte Esterase  NEGATIVE    


 


Urine Hemoglobin  NEGATIVE    


 


Urine Glucose  NEGATIVE    


 


Urine Total Protein  NEGATIVE    


 


Troponin I   < 0.012   


 


Sodium Level    138  


 


Potassium Level    4.3  


 


Chloride Level    105  


 


Carbon Dioxide Level    29  


 


Anion Gap    8  


 


Blood Urea Nitrogen    12  


 


Creatinine    1.00  


 


Glucose Level    89  


 


Calcium Level    8.8  


 


Phosphorus Level    4.5  


 


Magnesium Level    2.1  


 


Total Bilirubin    0.0  L


 


Direct Bilirubin    0.00  


 


Indirect Bilirubin    0.0  


 


Aspartate Amino Transf


(AST/SGOT) 


  


  


  30  


 


 


Alanine Aminotransferase


(ALT/SGPT) 


  


  


  52  


 


 


Alkaline Phosphatase    76  


 


Total Protein    6.0  L


 


Albumin    3.0  L


 


Globulin    3.00  


 


Albumin/Globulin Ratio    1.00  














Test


  5/26/17


06:55 5/26/17


07:37 


  


 


 


White Blood Count 6.6     


 


Red Blood Count 4.30  L   


 


Hemoglobin 12.2  L   


 


Hematocrit 38.0  L   


 


Mean Corpuscular Volume 88.4     


 


Mean Corpuscular Hemoglobin 28.4  L   


 


Mean Corpuscular Hemoglobin


Concent 32.1  


  


  


  


 


 


Red Cell Distribution Width 12.5     


 


Platelet Count 246  #   


 


Mean Platelet Volume 10.2     


 


Neutrophils % 56.7     


 


Lymphocytes % 24.4     


 


Monocytes % 11.9  H   


 


Eosinophils % 5.4     


 


Basophils % 0.8     


 


Nucleated Red Blood Cells % 0.0     


 


Neutrophils # 3.8     


 


Lymphocytes # 1.6     


 


Monocytes # 0.8     


 


Eosinophils # 0.4     


 


Basophils # 0.1     


 


Nucleated Red Blood Cells # 0.0     


 


Lab Scanned Report  REFERENCE LAB    











Medications


Medications





 Current Medications


Buspirone HCl (Buspar) 10 mg BID PO  Last administered on 5/26/17at 08:10; 

Admin Dose 10 MG;  Start 5/21/17 at 21:00


Pregabalin (Lyrica) 75 mg BID PO  Last administered on 5/26/17at 08:10; Admin 

Dose 75 MG;  Start 5/21/17 at 21:00


Polyethylene Glycol (Miralax) 8.5 gm DAILY PO  Last administered on 5/24/17at 08

:57; Admin Dose 8.5 GM;  Start 5/22/17 at 09:00


Aspirin (Halfprin) 81 mg DAILY PO  Last administered on 5/26/17at 08:10; Admin 

Dose 81 MG;  Start 5/22/17 at 09:00


Atorvastatin Calcium (Lipitor) 20 mg HS PO  Last administered on 5/25/17at 20:29

; Admin Dose 20 MG;  Start 5/21/17 at 21:00


Methocarbamol (Robaxin) 500 mg BID  PRN PO msc spasm Last administered on 5/23/ 17at 08:09; Admin Dose 500 MG;  Start 5/21/17 at 18:30


Ondansetron HCl (Zofran Inj) 4 mg Q6H  PRN IV NAUSEA AND/OR VOMITING;  Start 5/ 21/17 at 18:30


Amoxicillin (Amoxicillin) 500 mg Q8 PO  Last administered on 5/26/17at 06:07; 

Admin Dose 500 MG;  Start 5/21/17 at 22:00


Metoprolol Tartrate (Lopressor) 25 mg BID PO  Last administered on 5/25/17at 20:

28; Admin Dose 25 MG;  Start 5/22/17 at 21:00


Morphine Sulfate (morphine) 4 mg Q4H  PRN IM PAIN LEVEL 6-10 Last administered 

on 5/24/17at 20:08; Admin Dose 4 MG;  Start 5/22/17 at 23:00


Famotidine (Pepcid) 20 mg BID PO  Last administered on 5/26/17at 08:10; Admin 

Dose 20 MG;  Start 5/23/17 at 21:00


Menthol/Methyl Salicylate (Srini Pierre) 1 applic TID TOP  Last administered on 5/26/ 17at 08:11; Admin Dose 1 APPLIC;  Start 5/24/17 at 21:00


Nitroglycerin (Nitroglycerin (Sl Tab) 0.4 Mg) 1 tab Q5M  PRN SL ANGINA Last 

administered on 5/25/17at 20:31; Admin Dose 1 TAB;  Start 5/24/17 at 16:10


Patient Own Medication 1 ea TID  PRN PO PAIN Last administered on 5/26/17at 06:

07; Admin Dose 1 EA;  Start 5/25/17 at 15:00


Patient Own Medication 1 ea QHS  PRN PO PAIN Last administered on 5/25/17at 20:

27; Admin Dose 1 EA;  Start 5/25/17 at 15:00











TATA JASON MD May 26, 2017 08:38

## 2017-10-11 ENCOUNTER — HOSPITAL ENCOUNTER (OUTPATIENT)
Dept: HOSPITAL 10 - SDS | Age: 52
Discharge: HOME | End: 2017-10-11
Attending: INTERNAL MEDICINE
Payer: COMMERCIAL

## 2017-10-11 VITALS — DIASTOLIC BLOOD PRESSURE: 92 MMHG | SYSTOLIC BLOOD PRESSURE: 137 MMHG | HEART RATE: 60 BPM | RESPIRATION RATE: 13 BRPM

## 2017-10-11 VITALS — SYSTOLIC BLOOD PRESSURE: 128 MMHG | DIASTOLIC BLOOD PRESSURE: 85 MMHG | RESPIRATION RATE: 15 BRPM | HEART RATE: 66 BPM

## 2017-10-11 VITALS — DIASTOLIC BLOOD PRESSURE: 80 MMHG | RESPIRATION RATE: 13 BRPM | HEART RATE: 60 BPM | SYSTOLIC BLOOD PRESSURE: 133 MMHG

## 2017-10-11 VITALS
WEIGHT: 242.73 LBS | HEIGHT: 69 IN | HEIGHT: 69 IN | BODY MASS INDEX: 35.95 KG/M2 | BODY MASS INDEX: 35.95 KG/M2 | WEIGHT: 242.73 LBS

## 2017-10-11 VITALS — DIASTOLIC BLOOD PRESSURE: 77 MMHG | HEART RATE: 60 BPM | RESPIRATION RATE: 18 BRPM | SYSTOLIC BLOOD PRESSURE: 126 MMHG

## 2017-10-11 VITALS — DIASTOLIC BLOOD PRESSURE: 79 MMHG | HEART RATE: 60 BPM | SYSTOLIC BLOOD PRESSURE: 130 MMHG | RESPIRATION RATE: 13 BRPM

## 2017-10-11 VITALS — DIASTOLIC BLOOD PRESSURE: 84 MMHG | RESPIRATION RATE: 16 BRPM | SYSTOLIC BLOOD PRESSURE: 135 MMHG | HEART RATE: 62 BPM

## 2017-10-11 VITALS — DIASTOLIC BLOOD PRESSURE: 77 MMHG | RESPIRATION RATE: 17 BRPM | SYSTOLIC BLOOD PRESSURE: 119 MMHG | HEART RATE: 63 BPM

## 2017-10-11 VITALS — RESPIRATION RATE: 12 BRPM | SYSTOLIC BLOOD PRESSURE: 121 MMHG | HEART RATE: 60 BPM | DIASTOLIC BLOOD PRESSURE: 86 MMHG

## 2017-10-11 VITALS — RESPIRATION RATE: 16 BRPM | SYSTOLIC BLOOD PRESSURE: 126 MMHG | DIASTOLIC BLOOD PRESSURE: 69 MMHG | HEART RATE: 54 BPM

## 2017-10-11 VITALS — SYSTOLIC BLOOD PRESSURE: 124 MMHG | RESPIRATION RATE: 22 BRPM | DIASTOLIC BLOOD PRESSURE: 71 MMHG | HEART RATE: 66 BPM

## 2017-10-11 DIAGNOSIS — I10: ICD-10-CM

## 2017-10-11 DIAGNOSIS — I26.99: ICD-10-CM

## 2017-10-11 DIAGNOSIS — R07.9: Primary | ICD-10-CM

## 2017-10-11 LAB
ANION GAP SERPL CALC-SCNC: 9 MMOL/L (ref 8–16)
APTT BLD: 27.7 SEC (ref 25–35)
BASOPHILS # BLD AUTO: 0.1 10^3/UL (ref 0–0.1)
BASOPHILS NFR BLD: 1.6 % (ref 0–2)
BUN SERPL-MCNC: 14 MG/DL (ref 7–20)
CALCIUM SERPL-MCNC: 6.8 MG/DL (ref 8.4–10.2)
CHLORIDE SERPL-SCNC: 118 MMOL/L (ref 97–110)
CHOLEST SERPL-MCNC: 115 MG/DL (ref 100–200)
CHOLEST/HDLC SERPL: 4.6 RATIO
CO2 SERPL-SCNC: 19 MMOL/L (ref 21–31)
CREAT SERPL-MCNC: 0.83 MG/DL (ref 0.61–1.24)
EOSINOPHIL # BLD: 0.4 10^3/UL (ref 0–0.5)
EOSINOPHIL NFR BLD: 5.5 % (ref 0–7)
ERYTHROCYTE [DISTWIDTH] IN BLOOD BY AUTOMATED COUNT: 13.4 % (ref 11.5–14.5)
GLUCOSE SERPL-MCNC: 86 MG/DL (ref 70–220)
HCT VFR BLD CALC: 47.9 % (ref 42–52)
HDLC SERPL-MCNC: 25 MG/DL (ref 28–71)
HGB BLD-MCNC: 15.7 G/DL (ref 14–18)
INR PPP: 0.92
LYMPHOCYTES # BLD AUTO: 1.7 10^3/UL (ref 0.8–2.9)
LYMPHOCYTES NFR BLD AUTO: 27.1 % (ref 15–51)
MCH RBC QN AUTO: 28.8 PG (ref 29–33)
MCHC RBC AUTO-ENTMCNC: 32.8 G/DL (ref 32–37)
MCV RBC AUTO: 87.9 FL (ref 82–101)
MONOCYTES # BLD: 0.6 10^3/UL (ref 0.3–0.9)
MONOCYTES NFR BLD: 9.7 % (ref 0–11)
NEUTROPHILS # BLD: 3.5 10^3/UL (ref 1.6–7.5)
NEUTROPHILS NFR BLD AUTO: 55.8 % (ref 39–77)
NRBC # BLD MANUAL: 0 10^3/UL (ref 0–0)
NRBC BLD AUTO-RTO: 0 /100WBC (ref 0–0)
PLATELET # BLD: 248 10^3/UL (ref 140–415)
PMV BLD AUTO: 9.7 FL (ref 7.4–10.4)
POTASSIUM SERPL-SCNC: 3.7 MMOL/L (ref 3.5–5.1)
PROTHROMBIN TIME: 12.4 SEC (ref 12.2–14.2)
PT RATIO: 1
RBC # BLD AUTO: 5.45 10^6/UL (ref 4.7–6.1)
SODIUM SERPL-SCNC: 142 MMOL/L (ref 135–144)
TRIGL SERPL-MCNC: 85 MG/DL (ref 0–149)
WBC # BLD AUTO: 6.3 10^3/UL (ref 4.8–10.8)

## 2017-10-11 PROCEDURE — C1887 CATHETER, GUIDING: HCPCS

## 2017-10-11 PROCEDURE — 85730 THROMBOPLASTIN TIME PARTIAL: CPT

## 2017-10-11 PROCEDURE — 85610 PROTHROMBIN TIME: CPT

## 2017-10-11 PROCEDURE — 85025 COMPLETE CBC W/AUTO DIFF WBC: CPT

## 2017-10-11 PROCEDURE — 93458 L HRT ARTERY/VENTRICLE ANGIO: CPT

## 2017-10-11 PROCEDURE — 93005 ELECTROCARDIOGRAM TRACING: CPT

## 2017-10-11 PROCEDURE — 71010: CPT

## 2017-10-11 PROCEDURE — 80048 BASIC METABOLIC PNL TOTAL CA: CPT

## 2017-10-11 PROCEDURE — 80061 LIPID PANEL: CPT

## 2017-10-11 NOTE — RADRPT
PROCEDURE:   XR Chest. 

 

CLINICAL INDICATION: Chest pain

 

TECHNIQUE:   A frontal view of the chest was performed. 

 

COMPARISON: April 27, 2016

 

FINDINGS:

The cardiomediastinal silhouette is within normal limits. The lungs are clear. No signs of pleural f
luid or pneumothorax are seen. The osseous structures and soft tissues are unremarkable. 

 

IMPRESSION:

No evidence for active cardiopulmonary disease. No interval change.

 

RPTAT: QQ

_____________________________________________ 

.Maura Hartman MD, MD           Date    Time 

Electronically viewed and signed by .Maura Hartman MD, MD on 10/11/2017 09:50 

 

D:  10/11/2017 09:50  T:  10/11/2017 09:50

.F/

## 2017-10-11 NOTE — SIPON
Date/Time of Note


Date/Time of Note


DATE: 10/11/17 


TIME: 12:43





Operative Report


Preoperative Diagnosis


1.Abnl MPI


2.Chest pain


Postoperative Diagnosis


1.NOn-obstructive cad


Operation/Procedure Performed


1.[facility]


2.Moderate concious sedation


Surgeon


see signature line


First assist


Jannet


Anesthesia:  moderate sedation


Estimated blood loss:  minimal


Transfusion Required


   none


Specimen


NA


Grafts/Implants


none


Complications


none











CARL WALTON Oct 11, 2017 12:44

## 2017-10-12 NOTE — CARRPT
DATE OF PROCEDURE:  10/11/2017

 

 

TYPE OF PROCEDURE:

1.  Left heart catheterization.

2.  Coronary angiography.

3.  Left ventriculogram.

 

ATTENDING PHYSICIAN:  Dr. Carl Fine.

 

REFERRING PHYSICIAN:  Self-referred.

 

INDICATION:  Chest pain, prior non-ST elevation myocardial infarction and positive ischemia by Kely
can stress test.

 

TYPE OF ANESTHESIA:  Conscious and local.

 

BRIEF HISTORY:  Mr. Krishnan is a 52-year-old male with history of hypertension, dyslipidemia, diabetes
 mellitus who had initially suffered pulmonary embolism, in this setting had positive troponins and 
ruled in for non-ST elevation myocardial infarction.  The patient subsequently was discharged to out
patient followup, continued to have chest pain and underwent a cardiac stress test revealing positiv
e ischemia.  He is now being brought to the cardiac cath lab in order to assess for the possibility 
of significant obstructive coronary artery disease due to the chest pain, non-ST elevation myocardia
l infarction and positive stress test findings.

 

PROCEDURE:  After informed consent was obtained, the patient was brought to the [facility] cardiac catheterization lab where his right radial artery was prepped and draped in the usu
al sterile fashion.  Lidocaine 2% was done to the right radial artery in order to achieve adequate l
ocal anesthesia.  Using modified Seldinger technique, the radial artery was cannulated and a 6-Frenc
h arterial sheath was placed.  A 6-[place] JL3.5 catheter was used to cannulate the left main coronar
y ostium.  With contrast injection, multiple views of the left coronary arterial system were obtaine
d.  JL3.5 removed over the guidewire.  JR4 was used to cannulate the right coronary arterial ostium.
  With contrast injection, multiple views of the right coronary arterial system were obtained, remov
ed over the guidewire.  A 6-[place] pigtail was passed down the ascending aorta into the left ventric
le.  Left ventricular end-diastolic pressure was measured and 20 mL of contrast were injected, perfo
rming a left ventriculogram, pulled back across the aortic valve to assess for significant gradient 
which there was not and removed.  Subsequently, this completed the procedure.  The patient's sheath 
was removed.  TR band was applied.  There were no noted complications.

 

FINDINGS:

1.  Coronary angiography:  Left main 4 mm, no significant stenoses.  Circumflex proximally 3.5 mm wi
th a 20% mid stenosis.  Remainder of the circumflex is free of significant focal stenoses.  Two mid 
branching obtuse marginals, each sub 2 mm vessels, with no significant focal stenoses.  The LAD prox
imally is a 3.5 mm vessel and has really no significant focal stenoses throughout its entirety.  It 
gives off a 2 mm diagonal branch with no stenoses.  The right coronary artery proximally is a 4 mm v
essel, has a tubular 20% to 30% stenosis in its mid portion and a 20% mid distal stenosis.  It is a 
dominant vessel, gives off a 3 mm PDA with an ostial 30% to 40% stenosis and a 3 mm posterolateral b
ranch with no significant focal stenoses.

2.  Left ventriculogram revealed a preserved left ventricular ejection fraction of 60%, left ventric
ular end diastolic pressure of 4 pre-LV gram, 8 post-LV gram, no significant aortic stenosis by grad
ient, 1+ mitral regurgitation.

 

TOTAL FLUOROSCOPY TIME:  3.2 minutes.

 

TOTAL CONTRAST:  85 mL.

 

IMPRESSION:

1.  Very mild nonobstructive coronary artery disease.

2.  Preserved left ventricular systolic function.

3.  No significant aortic stenosis by gradient.

4.  1+ mitral regurgitation.

 

RECOMMENDATIONS:  In light of procedure findings at this time would:

1.  Maximize medical management.

2.  Aggressive risk factor reduction.

3.  The patient to be readmitted to the same day surgery center for post-cath observation and contin
ued management of symptoms with probable discharge later in the afternoon.  A followup appointment i
n our office tomorrow at which time we will discuss the results of this procedure and to ensure the 
patient had no post-groin catheterization complications.

 

 

Dictated By: CARL DEAL/SARTHAK

DD:    10/11/2017 12:48:32

DT:    10/12/2017 06:22:50

Conf#: [id number]

DID#:  [id number]

## 2017-10-13 NOTE — RADRPT
Vent Rate: 57 bpm

RR Interval: 0 msec

NC Interval: 168 msec

QRS Duration: 86 msec

QT Interval: 424 msec

QTC Interval: 412 msec

P-R-T Axis: 68 - -9 - 56 degrees

 

 Sinus bradycardia

 Otherwise normal ECG

 

Electronically Signed By: Jarred Bourgeois

[id number]

## 2017-12-04 ENCOUNTER — HOSPITAL ENCOUNTER (OUTPATIENT)
Dept: HOSPITAL 10 - SDS | Age: 52
Discharge: HOME | End: 2017-12-04
Attending: SURGERY
Payer: COMMERCIAL

## 2017-12-04 VITALS — HEART RATE: 56 BPM | SYSTOLIC BLOOD PRESSURE: 115 MMHG | DIASTOLIC BLOOD PRESSURE: 72 MMHG | RESPIRATION RATE: 16 BRPM

## 2017-12-04 VITALS
WEIGHT: 244.49 LBS | HEIGHT: 69 IN | WEIGHT: 244.49 LBS | HEIGHT: 69 IN | BODY MASS INDEX: 36.21 KG/M2 | BODY MASS INDEX: 36.21 KG/M2

## 2017-12-04 VITALS — DIASTOLIC BLOOD PRESSURE: 74 MMHG | RESPIRATION RATE: 16 BRPM | SYSTOLIC BLOOD PRESSURE: 124 MMHG | HEART RATE: 57 BPM

## 2017-12-04 DIAGNOSIS — I82.402: Primary | ICD-10-CM

## 2017-12-04 PROCEDURE — 37191 INS ENDOVAS VENA CAVA FILTR: CPT

## 2017-12-04 NOTE — OPR
Date/Time of Note


Date/Time of Note


DATE: 12/4/17 


TIME: 17:00





Operative Report


Procedure Date:  Dec 4, 2017


Preoperative Diagnosis


LLE DVT


Postoperative Diagnosis


SAME


Operation/Procedure Performed


IVC FILTER PLACEMENT


Surgeon


see signature line


Assistant


NONE


Anesthesia Type:  moderate sedation


Estimated Blood Loss:  minimal


Transfusion


   none


Specimen


NONE


Grafts/Implants


none


Complications


none


Pt Condition Post Procedure:  stable


Disposition:  PACU


Procedure Description


DATE OF OPERATION:  12/4/17


 


SURGEON:  Gerald Alston MD





PREOPERATIVE DIAGNOSIS:  Left lower extremity DVT 


 


POSTOPERATIVE DIAGNOSIS:   same


 


ANESTHESIA:  Local with Sedation 


 


BLOOD LOSS:  minimal


 


COMPLICATIONS:  None. 


 


HEPARIN: None





CONTRAST: As recorded





ACCESS: 6Fr Sheath right CFA





CLOSURE: Manual compression


 


INDICATIONS:  This is a 52-year-old male whom has a history of left lower 

extremity deep vein thrombosis is scheduled to undergo an orthopedic procedure 

and scheduled to hold his anticoagulation. We have had a long discussion with 

the patient and he's scheduled for IVC filter removal once he has recovered 

from his back surgery and can restart his anticoagulation. Patient has been 

informed of the alternatives, risks, and benefits of angiogram, balloon 

angioplasty, and stenting.  Risks including but not limited to bleeding, 

thrombosis, embolization, myocardial infarction, death, device malfunction, 

infection, and nephrotoxicity and patient has agreed to proceed.  This is the 

first diagnostic angiogram in this clinical setting


 


PROCEDURE:  


1. Ultrasound guided access of right common femoral vein


2. Placement of IVC Filter


3. Moderate sedation: Patients  was admisistered moderate sedation under 

physician supervision and continuous monitoring was performed of the patients 

vitals. spent time 15minutes





FINDINGS:  


-Patent right  iliac venous system


-Patent IVC





DESCRIPTION OF THE PROCEDURE:  The patient was brought to the angio suite and 

positioned in the supine position on the fluoroscopic table.  The right groin 

was prepped and draped in the standard fashion. Using 1% lidocaine, the area 

overlying the common femoral vein was anesthetized. The common femoral vein was 

punctured with the Seldinger needle and a guide wire placed into the vena cava 

to the level of the second lumbar vertebrae under fluoroscopic guidance. A 

pigtail catheter was placed into the vena  cava over a guide wire. Contrast 

venography was performed and there was no thrombus within the vena cava or 

iliac veins. Intravascular ultrasound was performed and findings as stated 

above. The vena cava is 20 mm in diameter. The renal veins and iliac veins 

confluence is identified and marked on the screen. The tract was serially 

dilated over the guide wire and the provided sheath placed into the vena cava 

under fluoroscopy. The sheath was flushed with heparin solution. The device was 

inserted over the wire into the sheath and positioned below the lowest renal 

vein. The sheath was withdrawn, exposing the filter. With continuous 

fluoroscopic imaging, the filter was deployed in the infrarenal vena cava. 

Completion venogram showed good filter position within the vena cava without 

thrombus formation. The device and sheath were removed and manual compression 

applied to the puncture site for hemostasis. Dressings were applied. The 

patient tolerated the procedure well and was taken to the postanesthesia care 

unit in stable condition.











GERALD ALSTON MD Dec 4, 2017 17:07

## 2017-12-04 NOTE — PDOCDIS
Discharge Instructions


DIAGNOSIS


Discharge Diagnosis


LLE DVT





CONDITION


Patient Condition:  Good





HOME CARE INSTRUCTIONS:


Diet Instructions:  Regular





ACTIVITY:








Activity Restrictions:   Do not Drive





 Do not operate Machinery





 Do not operate Power Tool





 Avoid Heavy Housework





Bathing Restrictions:  Shower





FOLLOW UP/APPOINTMENTS


Follow-up Plan


FOLLOWUP POST ORTHOPEDIC SURGERY FOR REMOVAL OF IVC FILTER











GUILLERMO ALSTON MD Dec 4, 2017 12:54

## 2017-12-04 NOTE — HPN
Date/Time of Note


Date/Time of Note


DATE: 12/4/17 


TIME: 12:53





Interval H&P Admission Note


Pt. seen H&P reviewed:  No system changes











GUILLERMO ALSTON MD Dec 4, 2017 12:53

## 2017-12-04 NOTE — SIPON
Date/Time of Note


Date/Time of Note


DATE: 12/4/17 


TIME: 16:43





Operative Report


Preoperative Diagnosis


LLE DVT


Postoperative Diagnosis


SAME


Operation/Procedure Performed


IVC FILTER PLACEMENT


Surgeon


see signature line


First assist


NONE


Anesthesia:  moderate sedation


Estimated blood loss:  minimal


Transfusion Required


   none


Specimen


NONE


Grafts/Implants


none


Complications


none











GUILLERMO ALSTON MD Dec 4, 2017 16:43

## 2017-12-28 ENCOUNTER — HOSPITAL ENCOUNTER (INPATIENT)
Dept: HOSPITAL 91 - MS4 | Age: 52
LOS: 2 days | Discharge: HOME | DRG: 392 | End: 2017-12-30
Payer: COMMERCIAL

## 2017-12-28 ENCOUNTER — HOSPITAL ENCOUNTER (INPATIENT)
Age: 52
LOS: 2 days | Discharge: HOME | DRG: 392 | End: 2017-12-30

## 2017-12-28 DIAGNOSIS — I25.2: ICD-10-CM

## 2017-12-28 DIAGNOSIS — F32.9: ICD-10-CM

## 2017-12-28 DIAGNOSIS — F41.9: ICD-10-CM

## 2017-12-28 DIAGNOSIS — Z86.711: ICD-10-CM

## 2017-12-28 DIAGNOSIS — E87.0: ICD-10-CM

## 2017-12-28 DIAGNOSIS — K21.9: Primary | ICD-10-CM

## 2017-12-28 DIAGNOSIS — I10: ICD-10-CM

## 2017-12-28 DIAGNOSIS — R07.89: ICD-10-CM

## 2017-12-28 DIAGNOSIS — F17.200: ICD-10-CM

## 2017-12-28 DIAGNOSIS — E11.9: ICD-10-CM

## 2017-12-28 DIAGNOSIS — R00.1: ICD-10-CM

## 2017-12-28 DIAGNOSIS — N28.9: ICD-10-CM

## 2017-12-28 DIAGNOSIS — I25.10: ICD-10-CM

## 2017-12-28 DIAGNOSIS — Z86.718: ICD-10-CM

## 2017-12-28 LAB
ADD MAN DIFF?: NO
ADD UMIC: NO
ALANINE AMINOTRANSFERASE: 45 IU/L (ref 13–69)
ALBUMIN/GLOBULIN RATIO: 1.58
ALBUMIN: 3.8 G/DL (ref 3.3–4.9)
ALKALINE PHOSPHATASE: 59 IU/L (ref 42–121)
ANION GAP: 13 (ref 8–16)
ASPARTATE AMINO TRANSFERASE: 26 IU/L (ref 15–46)
B-TYPE NATRIURETIC PEPTIDE: 227 PG/ML (ref 0–125)
BASOPHIL #: 0.1 10^3/UL (ref 0–0.1)
BASOPHILS %: 1 % (ref 0–2)
BILIRUBIN,DIRECT: 0 MG/DL (ref 0–0.2)
BILIRUBIN,TOTAL: 0.2 MG/DL (ref 0.2–1.3)
BLOOD UREA NITROGEN: 15 MG/DL (ref 7–20)
CALCIUM: 9.2 MG/DL (ref 8.4–10.2)
CARBON DIOXIDE: 26 MMOL/L (ref 21–31)
CHLORIDE: 105 MMOL/L (ref 97–110)
CREATININE: 1.26 MG/DL (ref 0.61–1.24)
EOSINOPHILS #: 0.3 10^3/UL (ref 0–0.5)
EOSINOPHILS %: 3.4 % (ref 0–7)
GLOBULIN: 2.4 G/DL (ref 1.3–3.2)
GLUCOSE: 85 MG/DL (ref 70–220)
HEMATOCRIT: 43.3 % (ref 42–52)
HEMOGLOBIN: 14.2 G/DL (ref 14–18)
INR: 1
LIPASE: 87 U/L (ref 23–300)
LYMPHOCYTES #: 2 10^3/UL (ref 0.8–2.9)
LYMPHOCYTES %: 26.2 % (ref 15–51)
MEAN CORPUSCULAR HEMOGLOBIN: 29.1 PG (ref 29–33)
MEAN CORPUSCULAR HGB CONC: 32.8 G/DL (ref 32–37)
MEAN CORPUSCULAR VOLUME: 88.7 FL (ref 82–101)
MEAN PLATELET VOLUME: 9.7 FL (ref 7.4–10.4)
MONOCYTE #: 0.8 10^3/UL (ref 0.3–0.9)
MONOCYTES %: 10.2 % (ref 0–11)
NEUTROPHIL #: 4.5 10^3/UL (ref 1.6–7.5)
NEUTROPHILS %: 58.5 % (ref 39–77)
NUCLEATED RED BLOOD CELLS #: 0 10^3/UL (ref 0–0)
NUCLEATED RED BLOOD CELLS%: 0 /100WBC (ref 0–0)
PLATELET COUNT: 249 10^3/UL (ref 140–415)
POTASSIUM: 4.5 MMOL/L (ref 3.5–5.1)
PROTIME: 13.3 SEC (ref 11.9–14.9)
PT RATIO: 1
RED BLOOD COUNT: 4.88 10^6/UL (ref 4.7–6.1)
RED CELL DISTRIBUTION WIDTH: 13.2 % (ref 11.5–14.5)
SODIUM: 139 MMOL/L (ref 135–144)
TOTAL PROTEIN: 6.2 G/DL (ref 6.1–8.1)
TROPONIN-I: < 0.012 NG/ML (ref 0–0.12)
UR ASCORBIC ACID: NEGATIVE MG/DL
UR BILIRUBIN (DIP): NEGATIVE MG/DL
UR BLOOD (DIP): NEGATIVE MG/DL
UR CLARITY: CLEAR
UR COLOR: (no result)
UR GLUCOSE (DIP): NEGATIVE MG/DL
UR KETONES (DIP): NEGATIVE MG/DL
UR LEUKOCYTE ESTERASE (DIP): NEGATIVE LEU/UL
UR NITRITE (DIP): NEGATIVE MG/DL
UR PH (DIP): 8 (ref 5–9)
UR SPECIFIC GRAVITY (DIP): 1.01 (ref 1–1.03)
UR TOTAL PROTEIN (DIP): NEGATIVE MG/DL
UR UROBILINOGEN (DIP): NEGATIVE MG/DL
URINE PH (DIP) POC: 7.5 (ref 5–8.5)
WHITE BLOOD COUNT: 7.6 10^3/UL (ref 4.8–10.8)

## 2017-12-28 PROCEDURE — 87400 INFLUENZA A/B EACH AG IA: CPT

## 2017-12-28 PROCEDURE — 71275 CT ANGIOGRAPHY CHEST: CPT

## 2017-12-28 PROCEDURE — 82150 ASSAY OF AMYLASE: CPT

## 2017-12-28 PROCEDURE — 71010: CPT

## 2017-12-28 PROCEDURE — 83735 ASSAY OF MAGNESIUM: CPT

## 2017-12-28 PROCEDURE — 80048 BASIC METABOLIC PNL TOTAL CA: CPT

## 2017-12-28 PROCEDURE — 96375 TX/PRO/DX INJ NEW DRUG ADDON: CPT

## 2017-12-28 PROCEDURE — 93970 EXTREMITY STUDY: CPT

## 2017-12-28 PROCEDURE — 81003 URINALYSIS AUTO W/O SCOPE: CPT

## 2017-12-28 PROCEDURE — 85610 PROTHROMBIN TIME: CPT

## 2017-12-28 PROCEDURE — 93306 TTE W/DOPPLER COMPLETE: CPT

## 2017-12-28 PROCEDURE — 83690 ASSAY OF LIPASE: CPT

## 2017-12-28 PROCEDURE — 36415 COLL VENOUS BLD VENIPUNCTURE: CPT

## 2017-12-28 PROCEDURE — 99285 EMERGENCY DEPT VISIT HI MDM: CPT

## 2017-12-28 PROCEDURE — 83880 ASSAY OF NATRIURETIC PEPTIDE: CPT

## 2017-12-28 PROCEDURE — 80053 COMPREHEN METABOLIC PANEL: CPT

## 2017-12-28 PROCEDURE — 80061 LIPID PANEL: CPT

## 2017-12-28 PROCEDURE — 93005 ELECTROCARDIOGRAM TRACING: CPT

## 2017-12-28 PROCEDURE — 84484 ASSAY OF TROPONIN QUANT: CPT

## 2017-12-28 PROCEDURE — 85025 COMPLETE CBC W/AUTO DIFF WBC: CPT

## 2017-12-28 PROCEDURE — 96374 THER/PROPH/DIAG INJ IV PUSH: CPT

## 2017-12-28 PROCEDURE — 84100 ASSAY OF PHOSPHORUS: CPT

## 2017-12-28 RX ADMIN — THIAMINE HYDROCHLORIDE 1 MLS/HR: 100 INJECTION, SOLUTION INTRAMUSCULAR; INTRAVENOUS at 22:55

## 2017-12-28 RX ADMIN — IODIXANOL 1 ML: 320 INJECTION, SOLUTION INTRAVASCULAR at 18:59

## 2017-12-28 RX ADMIN — RANOLAZINE 1 MG: 500 TABLET, FILM COATED, EXTENDED RELEASE ORAL at 22:49

## 2017-12-28 RX ADMIN — APIXABAN 1 MG: 5 TABLET, FILM COATED ORAL at 22:49

## 2017-12-28 RX ADMIN — THIAMINE HYDROCHLORIDE 1 MLS/HR: 100 INJECTION, SOLUTION INTRAMUSCULAR; INTRAVENOUS at 16:53

## 2017-12-28 RX ADMIN — SODIUM CHLORIDE 1 ML: 9 INJECTION, SOLUTION INTRAMUSCULAR; INTRAVENOUS; SUBCUTANEOUS at 19:00

## 2017-12-28 RX ADMIN — VASOPRESSIN 1: 20 INJECTION, SOLUTION INTRAMUSCULAR; SUBCUTANEOUS at 18:59

## 2017-12-28 RX ADMIN — ONDANSETRON HYDROCHLORIDE 1 MG: 2 INJECTION, SOLUTION INTRAMUSCULAR; INTRAVENOUS at 18:54

## 2017-12-29 LAB
ADD MAN DIFF?: NO
AMYLASE: 63 U/L (ref 11–123)
ANION GAP: 14 (ref 8–16)
B-TYPE NATRIURETIC PEPTIDE: 225 PG/ML (ref 0–125)
BASOPHIL #: 0.1 10^3/UL (ref 0–0.1)
BASOPHILS %: 1.4 % (ref 0–2)
BLOOD UREA NITROGEN: 19 MG/DL (ref 7–20)
CALCIUM: 8.9 MG/DL (ref 8.4–10.2)
CARBON DIOXIDE: 30 MMOL/L (ref 21–31)
CHLORIDE: 106 MMOL/L (ref 97–110)
CREATININE: 1.34 MG/DL (ref 0.61–1.24)
EOSINOPHILS #: 0.3 10^3/UL (ref 0–0.5)
EOSINOPHILS %: 4.3 % (ref 0–7)
GLUCOSE: 88 MG/DL (ref 70–220)
HEMATOCRIT: 40 % (ref 42–52)
HEMOGLOBIN: 13.3 G/DL (ref 14–18)
LIPASE: 60 U/L (ref 23–300)
LYMPHOCYTES #: 2.5 10^3/UL (ref 0.8–2.9)
LYMPHOCYTES %: 37.5 % (ref 15–51)
MAGNESIUM: 1.9 MG/DL (ref 1.7–2.5)
MEAN CORPUSCULAR HEMOGLOBIN: 30 PG (ref 29–33)
MEAN CORPUSCULAR HGB CONC: 33.3 G/DL (ref 32–37)
MEAN CORPUSCULAR VOLUME: 90.1 FL (ref 82–101)
MEAN PLATELET VOLUME: 9.8 FL (ref 7.4–10.4)
MONOCYTE #: 0.7 10^3/UL (ref 0.3–0.9)
MONOCYTES %: 10 % (ref 0–11)
NEUTROPHIL #: 3 10^3/UL (ref 1.6–7.5)
NEUTROPHILS %: 46.2 % (ref 39–77)
NUCLEATED RED BLOOD CELLS #: 0 10^3/UL (ref 0–0)
NUCLEATED RED BLOOD CELLS%: 0 /100WBC (ref 0–0)
PHOSPHORUS: 5.4 MG/DL (ref 2.5–4.9)
PLATELET COUNT: 212 10^3/UL (ref 140–415)
POTASSIUM: 4.4 MMOL/L (ref 3.5–5.1)
RED BLOOD COUNT: 4.44 10^6/UL (ref 4.7–6.1)
RED CELL DISTRIBUTION WIDTH: 13.4 % (ref 11.5–14.5)
SODIUM: 146 MMOL/L (ref 135–144)
TROPONIN-I: < 0.012 NG/ML (ref 0–0.12)
WHITE BLOOD COUNT: 6.5 10^3/UL (ref 4.8–10.8)

## 2017-12-29 RX ADMIN — MORPHINE SULFATE 1 MG: 2 INJECTION, SOLUTION INTRAMUSCULAR; INTRAVENOUS at 23:22

## 2017-12-29 RX ADMIN — PREGABALIN 1 MG: 75 CAPSULE ORAL at 10:20

## 2017-12-29 RX ADMIN — OXYCODONE HYDROCHLORIDE AND ACETAMINOPHEN 1 MG: 500 TABLET ORAL at 11:55

## 2017-12-29 RX ADMIN — RANOLAZINE 1 MG: 500 TABLET, FILM COATED, EXTENDED RELEASE ORAL at 10:19

## 2017-12-29 RX ADMIN — PREGABALIN 1 MG: 75 CAPSULE ORAL at 13:42

## 2017-12-29 RX ADMIN — METHOCARBAMOL 1 MG: 500 TABLET ORAL at 10:21

## 2017-12-29 RX ADMIN — MORPHINE SULFATE 1 MG: 2 INJECTION, SOLUTION INTRAMUSCULAR; INTRAVENOUS at 13:40

## 2017-12-29 RX ADMIN — MORPHINE SULFATE 1 MG: 2 INJECTION, SOLUTION INTRAMUSCULAR; INTRAVENOUS at 08:11

## 2017-12-29 RX ADMIN — ONDANSETRON HYDROCHLORIDE 1 MG: 2 INJECTION, SOLUTION INTRAMUSCULAR; INTRAVENOUS at 18:58

## 2017-12-29 RX ADMIN — PANTOPRAZOLE SODIUM 1 MG: 40 INJECTION, POWDER, FOR SOLUTION INTRAVENOUS at 18:25

## 2017-12-29 RX ADMIN — METOCLOPRAMIDE HYDROCHLORIDE 1 MG: 10 INJECTION, SOLUTION INTRAMUSCULAR; INTRAVENOUS at 23:23

## 2017-12-29 RX ADMIN — APIXABAN 1 MG: 5 TABLET, FILM COATED ORAL at 10:21

## 2017-12-29 RX ADMIN — METOCLOPRAMIDE HYDROCHLORIDE 1 MG: 10 INJECTION, SOLUTION INTRAMUSCULAR; INTRAVENOUS at 19:31

## 2017-12-29 RX ADMIN — ONDANSETRON HYDROCHLORIDE 1 MG: 2 INJECTION, SOLUTION INTRAMUSCULAR; INTRAVENOUS at 04:32

## 2017-12-29 RX ADMIN — METHOCARBAMOL 1 MG: 500 TABLET ORAL at 01:25

## 2017-12-29 RX ADMIN — MORPHINE SULFATE 1 MG: 2 INJECTION, SOLUTION INTRAMUSCULAR; INTRAVENOUS at 04:32

## 2017-12-29 RX ADMIN — PREGABALIN 1 MG: 75 CAPSULE ORAL at 20:52

## 2017-12-29 RX ADMIN — MORPHINE SULFATE 1 MG: 2 INJECTION, SOLUTION INTRAMUSCULAR; INTRAVENOUS at 18:58

## 2017-12-29 RX ADMIN — METOCLOPRAMIDE HYDROCHLORIDE 1 MG: 10 INJECTION, SOLUTION INTRAMUSCULAR; INTRAVENOUS at 12:12

## 2017-12-29 RX ADMIN — CYANOCOBALAMIN TAB 500 MCG 1 MCG: 500 TAB at 10:20

## 2017-12-29 RX ADMIN — ASPIRIN 1 MG: 81 TABLET, COATED ORAL at 10:20

## 2017-12-29 RX ADMIN — RANOLAZINE 1 MG: 500 TABLET, FILM COATED, EXTENDED RELEASE ORAL at 22:25

## 2017-12-29 RX ADMIN — APIXABAN 1 MG: 5 TABLET, FILM COATED ORAL at 20:52

## 2017-12-29 RX ADMIN — PANTOPRAZOLE SODIUM 1 MG: 40 INJECTION, POWDER, FOR SOLUTION INTRAVENOUS at 12:04

## 2017-12-29 RX ADMIN — PREGABALIN 1 MG: 75 CAPSULE ORAL at 01:23

## 2017-12-29 RX ADMIN — BUPROPION HYDROCHLORIDE 1 MG: 150 TABLET, EXTENDED RELEASE ORAL at 10:19

## 2017-12-30 LAB
ADD MAN DIFF?: NO
ANION GAP: 11 (ref 8–16)
BASOPHIL #: 0.1 10^3/UL (ref 0–0.1)
BASOPHILS %: 1.2 % (ref 0–2)
BLOOD UREA NITROGEN: 20 MG/DL (ref 7–20)
CALCIUM: 8.8 MG/DL (ref 8.4–10.2)
CARBON DIOXIDE: 28 MMOL/L (ref 21–31)
CHLORIDE: 106 MMOL/L (ref 97–110)
CHOL/HDL RATIO: 4.8 RATIO
CHOLESTEROL: 173 MG/DL (ref 100–200)
CREATININE: 1.32 MG/DL (ref 0.61–1.24)
EOSINOPHILS #: 0.3 10^3/UL (ref 0–0.5)
EOSINOPHILS %: 5.1 % (ref 0–7)
GLUCOSE: 102 MG/DL (ref 70–220)
HDL CHOLESTEROL: 36 MG/DL (ref 28–71)
HEMATOCRIT: 42.8 % (ref 42–52)
HEMOGLOBIN: 13.8 G/DL (ref 14–18)
LDL CHOLESTEROL,CALCULATED: 110 MG/DL
LYMPHOCYTES #: 1.9 10^3/UL (ref 0.8–2.9)
LYMPHOCYTES %: 30.7 % (ref 15–51)
MAGNESIUM: 1.9 MG/DL (ref 1.7–2.5)
MEAN CORPUSCULAR HEMOGLOBIN: 29.3 PG (ref 29–33)
MEAN CORPUSCULAR HGB CONC: 32.2 G/DL (ref 32–37)
MEAN CORPUSCULAR VOLUME: 90.9 FL (ref 82–101)
MEAN PLATELET VOLUME: 9.9 FL (ref 7.4–10.4)
MONOCYTE #: 0.7 10^3/UL (ref 0.3–0.9)
MONOCYTES %: 10.8 % (ref 0–11)
NEUTROPHIL #: 3.1 10^3/UL (ref 1.6–7.5)
NEUTROPHILS %: 51.7 % (ref 39–77)
NUCLEATED RED BLOOD CELLS #: 0 10^3/UL (ref 0–0)
NUCLEATED RED BLOOD CELLS%: 0 /100WBC (ref 0–0)
PHOSPHORUS: 5.4 MG/DL (ref 2.5–4.9)
PLATELET COUNT: 221 10^3/UL (ref 140–415)
POTASSIUM: 4.5 MMOL/L (ref 3.5–5.1)
RED BLOOD COUNT: 4.71 10^6/UL (ref 4.7–6.1)
RED CELL DISTRIBUTION WIDTH: 13.2 % (ref 11.5–14.5)
SODIUM: 140 MMOL/L (ref 135–144)
TRIGLYCERIDES: 137 MG/DL (ref 0–149)
TROPONIN-I: < 0.012 NG/ML (ref 0–0.12)
TROPONIN-I: < 0.012 NG/ML (ref 0–0.12)
WHITE BLOOD COUNT: 6 10^3/UL (ref 4.8–10.8)

## 2017-12-30 RX ADMIN — PREGABALIN 1 MG: 75 CAPSULE ORAL at 09:26

## 2017-12-30 RX ADMIN — OXYCODONE HYDROCHLORIDE AND ACETAMINOPHEN 1 MG: 500 TABLET ORAL at 08:34

## 2017-12-30 RX ADMIN — CYANOCOBALAMIN TAB 500 MCG 1 MCG: 500 TAB at 08:34

## 2017-12-30 RX ADMIN — ASPIRIN 1 MG: 81 TABLET, COATED ORAL at 08:34

## 2017-12-30 RX ADMIN — APIXABAN 1 MG: 5 TABLET, FILM COATED ORAL at 08:34

## 2017-12-30 RX ADMIN — METOCLOPRAMIDE HYDROCHLORIDE 1 MG: 10 INJECTION, SOLUTION INTRAMUSCULAR; INTRAVENOUS at 05:34

## 2017-12-30 RX ADMIN — BUPROPION HYDROCHLORIDE 1 MG: 150 TABLET, EXTENDED RELEASE ORAL at 08:34

## 2017-12-30 RX ADMIN — PANTOPRAZOLE SODIUM 1 MG: 40 INJECTION, POWDER, FOR SOLUTION INTRAVENOUS at 05:33

## 2017-12-30 RX ADMIN — RANOLAZINE 1 MG: 500 TABLET, FILM COATED, EXTENDED RELEASE ORAL at 08:34

## 2018-06-12 ENCOUNTER — HOSPITAL ENCOUNTER (OUTPATIENT)
Dept: HOSPITAL 91 - SDS | Age: 53
Discharge: HOME | End: 2018-06-12
Payer: COMMERCIAL

## 2018-06-12 ENCOUNTER — HOSPITAL ENCOUNTER (OUTPATIENT)
Age: 53
Discharge: HOME | End: 2018-06-12

## 2018-06-12 DIAGNOSIS — I25.10: ICD-10-CM

## 2018-06-12 DIAGNOSIS — I82.402: Primary | ICD-10-CM

## 2018-06-12 DIAGNOSIS — I26.99: ICD-10-CM

## 2018-06-12 LAB
ADD MAN DIFF?: NO
ADD UMIC: NO
ANION GAP: 9 (ref 8–16)
BASOPHIL #: 0.1 10^3/UL (ref 0–0.1)
BASOPHILS %: 1.6 % (ref 0–2)
BLOOD UREA NITROGEN: 16 MG/DL (ref 7–20)
CALCIUM: 9 MG/DL (ref 8.4–10.2)
CARBON DIOXIDE: 29 MMOL/L (ref 21–31)
CHLORIDE: 109 MMOL/L (ref 97–110)
CREATININE: 0.96 MG/DL (ref 0.61–1.24)
EOSINOPHILS #: 0.4 10^3/UL (ref 0–0.5)
EOSINOPHILS %: 7.3 % (ref 0–7)
GLUCOSE: 104 MG/DL (ref 70–220)
HEMATOCRIT: 44.6 % (ref 42–52)
HEMOGLOBIN: 14.3 G/DL (ref 14–18)
INR: 1
LYMPHOCYTES #: 1.5 10^3/UL (ref 0.8–2.9)
LYMPHOCYTES %: 25.9 % (ref 15–51)
MEAN CORPUSCULAR HEMOGLOBIN: 29.1 PG (ref 29–33)
MEAN CORPUSCULAR HGB CONC: 32.1 G/DL (ref 32–37)
MEAN CORPUSCULAR VOLUME: 90.8 FL (ref 82–101)
MEAN PLATELET VOLUME: 9.6 FL (ref 7.4–10.4)
MONOCYTE #: 0.7 10^3/UL (ref 0.3–0.9)
MONOCYTES %: 12.9 % (ref 0–11)
NEUTROPHIL #: 2.9 10^3/UL (ref 1.6–7.5)
NEUTROPHILS %: 51.9 % (ref 39–77)
NUCLEATED RED BLOOD CELLS #: 0 10^3/UL (ref 0–0)
NUCLEATED RED BLOOD CELLS%: 0 /100WBC (ref 0–0)
PARTIAL THROMBOPLASTIN TIME: 28 SEC (ref 25–35)
PLATELET COUNT: 222 10^3/UL (ref 140–415)
POTASSIUM: 5.1 MMOL/L (ref 3.5–5.1)
PROTIME: 13.3 SEC (ref 11.9–14.9)
PT RATIO: 1
RED BLOOD COUNT: 4.91 10^6/UL (ref 4.7–6.1)
RED CELL DISTRIBUTION WIDTH: 12.6 % (ref 11.5–14.5)
SODIUM: 142 MMOL/L (ref 135–144)
UR ASCORBIC ACID: NEGATIVE MG/DL
UR BILIRUBIN (DIP): NEGATIVE MG/DL
UR BLOOD (DIP): NEGATIVE MG/DL
UR CLARITY: CLEAR
UR COLOR: YELLOW
UR GLUCOSE (DIP): NEGATIVE MG/DL
UR KETONES (DIP): NEGATIVE MG/DL
UR LEUKOCYTE ESTERASE (DIP): NEGATIVE LEU/UL
UR NITRITE (DIP): NEGATIVE MG/DL
UR PH (DIP): 5 (ref 5–9)
UR SPECIFIC GRAVITY (DIP): 1.02 (ref 1–1.03)
UR TOTAL PROTEIN (DIP): NEGATIVE MG/DL
UR UROBILINOGEN (DIP): NEGATIVE MG/DL
WHITE BLOOD COUNT: 5.6 10^3/UL (ref 4.8–10.8)

## 2018-06-12 PROCEDURE — 85610 PROTHROMBIN TIME: CPT

## 2018-06-12 PROCEDURE — 80048 BASIC METABOLIC PNL TOTAL CA: CPT

## 2018-06-12 PROCEDURE — 85730 THROMBOPLASTIN TIME PARTIAL: CPT

## 2018-06-12 PROCEDURE — 81003 URINALYSIS AUTO W/O SCOPE: CPT

## 2018-06-12 PROCEDURE — 37193 REM ENDOVAS VENA CAVA FILTER: CPT

## 2018-06-12 PROCEDURE — 71045 X-RAY EXAM CHEST 1 VIEW: CPT

## 2018-06-12 PROCEDURE — 85025 COMPLETE CBC W/AUTO DIFF WBC: CPT

## 2018-06-12 PROCEDURE — 93005 ELECTROCARDIOGRAM TRACING: CPT

## 2022-05-20 NOTE — CONS
Date/Time of Note


Date/Time of Note


DATE: 5/25/17 


TIME: 16:15





Assessment/Plan


Assessment/Plan


Chief Complaint/Hosp Course


IMPRESSION:


1.  Positive troponin in the setting of bilateral pulmonary emboli.-

downtrending. Likely due to PE type 2 infarct. NL EF by echo this admit


2.  Chest pain in the setting of bilateral pulmonary emboli.


3.  Dyspnea on exertion with known bilateral pulmonary emboli.


4.  Lower extremity edema with known lower extremity deep venous thrombosis.


5.  Anemia, mild.


6.  Ongoing tobacco usage.





Recc:


-Tele


-Continue asa/statin


-IV heparin with transition to po anticoagulation


-trend cardiac enzymes


-Continue BB as tolerated


-continue abx's and f/u cx data


Problems:  





Consultation Date/Type/Reason


Admit Date/Time


May 21, 2017 at 14:45


Initial Consult Date


5/22/17


Type of Consultation:  cardiology


Reason for Consultation


positive troponin


Referring Provider:  JO LEE





Exam/Review of Systems


Vital Signs


Vitals





 Vital Signs








  Date Time  Temp Pulse Resp B/P Pulse Ox O2 Delivery O2 Flow Rate FiO2


 


5/25/17 16:00  50      


 


5/25/17 15:25 97.8  20 122/68 98   


 


5/25/17 15:20       4.0 


 


5/25/17 08:00      Nasal Cannula  


 


5/24/17 14:44        36














 Intake and Output   


 


 5/24/17 5/24/17 5/25/17





 15:00 23:00 07:00


 


Intake Total 55.5 ml 964.5 ml 850 ml


 


Output Total  1000 ml 1200 ml


 


Balance 55.5 ml -35.5 ml -350 ml











Exam


Review of Systems:


CONSTITUTIONAL:  No fevers, chills.


PULMONARY:  No sob


CARDIOVASCULAR: No chest pain/palpitations


GASTROINTESTINAL:  No nausea/vomiting.


GENITOURINARY:  No hematuria/dysuria.


MUSCULOSKELETAL:  No myagias/arthalgias.


PSYCHIATRIC:  The patient denies depression.


NEUROLOGIC:   No weakness


Constitutional:  alert


Psych:  no complaints


Head:  normocephalic


ENMT:  mucosa pink and moist


Neck:  jvd, supple


Respiratory:  diminished breath sounds (at bases/b)


Cardiovascular:  regular rate and rhythm


Gastrointestinal:  non-tender, soft


Musculoskeletal:  muscle tone


Extremities:  edema


Neurological:  other (no focal deficits)





Results


Result Diagram:  


5/24/17 0720 5/24/17 0720





Results 24 hrs





Laboratory Tests








Test


  5/24/17


16:36 5/24/17


22:50 5/25/17


06:35 5/25/17


15:20


 


Activated Partial


Thromboplast Time 85.9  *H


  113.8  *H


  67.8  H


  87.5  *H


 


 


Prothrombin Time   14.0   14.1  


 


Prothrombin Time Ratio   1.1   1.1  


 


INR International Normalized


Ratio 


  


  1.08  


  1.09  


 


 


Troponin I   < 0.012   











Medications


Medications





 Current Medications


Buspirone HCl (Buspar) 10 mg BID PO  Last administered on 5/25/17at 08:54; 

Admin Dose 10 MG;  Start 5/21/17 at 21:00


Pregabalin (Lyrica) 75 mg BID PO  Last administered on 5/25/17at 08:54; Admin 

Dose 75 MG;  Start 5/21/17 at 21:00


Polyethylene Glycol (Miralax) 8.5 gm DAILY PO  Last administered on 5/24/17at 08

:57; Admin Dose 8.5 GM;  Start 5/22/17 at 09:00


Aspirin (Halfprin) 81 mg DAILY PO  Last administered on 5/25/17at 08:54; Admin 

Dose 81 MG;  Start 5/22/17 at 09:00


Atorvastatin Calcium (Lipitor) 20 mg HS PO  Last administered on 5/24/17at 21:32

; Admin Dose 20 MG;  Start 5/21/17 at 21:00


Methocarbamol (Robaxin) 500 mg BID  PRN PO msc spasm Last administered on 5/23/ 17at 08:09; Admin Dose 500 MG;  Start 5/21/17 at 18:30


Ondansetron HCl (Zofran Inj) 4 mg Q6H  PRN IV NAUSEA AND/OR VOMITING;  Start 5/ 21/17 at 18:30


Amoxicillin (Amoxicillin) 500 mg Q8 PO  Last administered on 5/25/17at 14:17; 

Admin Dose 500 MG;  Start 5/21/17 at 22:00


Metoprolol Tartrate (Lopressor) 25 mg BID PO  Last administered on 5/24/17at 21:

33; Admin Dose 25 MG;  Start 5/22/17 at 21:00


Morphine Sulfate (morphine) 4 mg Q4H  PRN IM PAIN LEVEL 6-10 Last administered 

on 5/24/17at 20:08; Admin Dose 4 MG;  Start 5/22/17 at 23:00


Famotidine (Pepcid) 20 mg BID PO  Last administered on 5/25/17at 08:54; Admin 

Dose 20 MG;  Start 5/23/17 at 21:00


Menthol/Methyl Salicylate (Srini Pierre) 1 applic TID TOP  Last administered on 5/25/ 17at 08:55; Admin Dose 1 APPLIC;  Start 5/24/17 at 21:00


Nitroglycerin (Nitroglycerin (Sl Tab) 0.4 Mg) 1 tab Q5M  PRN SL ANGINA;  Start 5 /24/17 at 16:10


Patient Own Medication 1 ea TID  PRN PO PAIN;  Start 5/25/17 at 15:00


Patient Own Medication 1 ea QHS  PRN PO PAIN;  Start 5/25/17 at 15:00











CARL WALTON May 25, 2017 16:18 PAST SURGICAL HISTORY:  History of open heart surgery     Other postprocedural status Right Shoulder    Other postprocedural status S/P right knee arthroscopy

## 2023-03-20 NOTE — CONS
Date/Time of Note


Date/Time of Note


DATE: 5/25/17 


TIME: 12:48





Assessment/Plan


Assessment/Plan


Additional Assessment/Plan


Assessment recommendations;





1.  Patient admitted for bilateral PE as well as left lower extremity DVT with 

recent back surgery.


2.  History of hypertension.





Continue current treatment.  Continue IV heparin.  Patient can be switched over 

to Xarelto or apixaban in 24 hours.





Consultation Date/Type/Reason


Admit Date/Time


May 21, 2017 at 14:45


Initial Consult Date


5/22/17


Type of Consultation:  Pulmonary


Referring Provider:  JO LEE





24 HR Interval Summary


Free Text/Dictation


Patient is reporting decreased shortness of breath still complains of left-

sided chest pain with deep breathing with interval improvement as well.  Denies 

any further left lower extremity pain.  Complains of very minimal shortness of 

breath upon exertion.  Complains of cough with scant sputum as well as mild 

hemoptysis.





General exam; middle-aged male, awake alert currently in no distress, sitting 

in a chair by bedside.





Exam/Review of Systems


Vital Signs


Vitals





 Vital Signs








  Date Time  Temp Pulse Resp B/P Pulse Ox O2 Delivery O2 Flow Rate FiO2


 


5/25/17 12:00  50      


 


5/25/17 11:16 97.8  20 103/59 98   


 


5/25/17 08:00      Nasal Cannula 3.0 


 


5/24/17 14:44        36














 Intake and Output   


 


 5/24/17 5/24/17 5/25/17





 15:00 23:00 07:00


 


Intake Total 55.5 ml 964.5 ml 850 ml


 


Output Total  1000 ml 1200 ml


 


Balance 55.5 ml -35.5 ml -350 ml











Exam


HEENT exam; supple neck, no JVD.  No lymphadenopathy.  Midline trachea.  No 

thyromegaly.  Pharynx is clear.





Chest examination; clear to auscultation.  S1-S2 audible, no murmurs.  Regular 

rhythm.





Abdomen examination; soft, nontender.  No organomegaly.  Bowel sounds audible.





Extremity exam is; minimal left calf tenderness.  No edema.  Pulses 1+ 

bilaterally.





CNS examination; no focal deficit.





Results


Result Diagram:  


5/24/17 0720                                                                   

             5/24/17 0720





Results 24 hrs





Laboratory Tests








Test


  5/24/17


16:36 5/24/17


22:50 5/25/17


06:35


 


Activated Partial


Thromboplast Time 85.9  *H


  113.8  *H


  67.8  H


 


 


Prothrombin Time   14.0  


 


Prothrombin Time Ratio   1.1  


 


INR International Normalized


Ratio 


  


  1.08  


 


 


Troponin I   < 0.012  











Medications


Medications





 Current Medications


Buspirone HCl (Buspar) 10 mg BID PO  Last administered on 5/25/17at 08:54; 

Admin Dose 10 MG;  Start 5/21/17 at 21:00


Pregabalin (Lyrica) 75 mg BID PO  Last administered on 5/25/17at 08:54; Admin 

Dose 75 MG;  Start 5/21/17 at 21:00


Morphine Sulfate (morphine) 2 mg Q4H  PRN IV pain Last administered on 5/24/ 17at 17:12; Admin Dose 2 MG;  Start 5/21/17 at 18:00


Polyethylene Glycol (Miralax) 8.5 gm DAILY PO  Last administered on 5/24/17at 08

:57; Admin Dose 8.5 GM;  Start 5/22/17 at 09:00


Aspirin (Halfprin) 81 mg DAILY PO  Last administered on 5/25/17at 08:54; Admin 

Dose 81 MG;  Start 5/22/17 at 09:00


Atorvastatin Calcium (Lipitor) 20 mg HS PO  Last administered on 5/24/17at 21:32

; Admin Dose 20 MG;  Start 5/21/17 at 21:00


Methocarbamol (Robaxin) 500 mg BID  PRN PO msc spasm Last administered on 5/23/ 17at 08:09; Admin Dose 500 MG;  Start 5/21/17 at 18:30


Ondansetron HCl (Zofran Inj) 4 mg Q6H  PRN IV NAUSEA AND/OR VOMITING;  Start 5/ 21/17 at 18:30


Amoxicillin (Amoxicillin) 500 mg Q8 PO  Last administered on 5/25/17at 06:11; 

Admin Dose 500 MG;  Start 5/21/17 at 22:00


Metoprolol Tartrate (Lopressor) 25 mg BID PO  Last administered on 5/24/17at 21:

33; Admin Dose 25 MG;  Start 5/22/17 at 21:00


Morphine Sulfate (morphine) 4 mg Q4H  PRN IM PAIN LEVEL 6-10 Last administered 

on 5/24/17at 20:08; Admin Dose 4 MG;  Start 5/22/17 at 23:00


Ketorolac Tromethamine (Toradol) 30 mg Q6H  PRN IV PAIN Last administered on 5/ 25/17at 11:42; Admin Dose 30 MG;  Start 5/23/17 at 02:00;  Stop 5/26/17 at 01:59


Famotidine (Pepcid) 20 mg BID PO  Last administered on 5/25/17at 08:54; Admin 

Dose 20 MG;  Start 5/23/17 at 21:00


Menthol/Methyl Salicylate (Srini Pierre) 1 applic TID TOP  Last administered on 5/25/ 17at 08:55; Admin Dose 1 APPLIC;  Start 5/24/17 at 21:00


Nitroglycerin (Nitroglycerin (Sl Tab) 0.4 Mg) 1 tab Q5M  PRN SL ANGINA;  Start 5 /24/17 at 16:10











SHIRIN LUCIO May 25, 2017 12:50 allergies: Continue Zyrtec as prescribed    5. Acid reflux: Doing well with Protonix    6. Reactive airways disease: Continue albuterol as needed    7. IBS: Continue hyoscyamine    8.  hyperlipidemia: Continue pravastatin as prescribed      Chandana Duran was seen today for medication check. Diagnoses and all orders for this visit:    Obesity, unspecified classification, unspecified obesity type, unspecified whether serious comorbidity present  -     phentermine 37.5 MG capsule; Take 1 capsule by mouth every morning for 30 days. Max Daily Amount: 37.5 mg    Other depression    Primary hypertension    Environmental allergies    Gastroesophageal reflux disease without esophagitis    Mild intermittent reactive airway disease without complication    Irritable bowel syndrome, unspecified type    Hyperlipidemia, unspecified hyperlipidemia type        Return in about 3 months (around 6/20/2023). No orders of the defined types were placed in this encounter.       Alice Jimenez MD
